# Patient Record
Sex: FEMALE | Race: WHITE | Employment: FULL TIME | URBAN - METROPOLITAN AREA
[De-identification: names, ages, dates, MRNs, and addresses within clinical notes are randomized per-mention and may not be internally consistent; named-entity substitution may affect disease eponyms.]

---

## 2017-10-10 ENCOUNTER — CARE COORDINATOR VISIT (OUTPATIENT)
Dept: CARE COORDINATION | Age: 22
End: 2017-10-10

## 2017-10-10 ENCOUNTER — OFFICE VISIT (OUTPATIENT)
Dept: FAMILY MEDICINE CLINIC | Age: 22
End: 2017-10-10

## 2017-10-10 VITALS
WEIGHT: 155 LBS | SYSTOLIC BLOOD PRESSURE: 118 MMHG | HEIGHT: 65 IN | BODY MASS INDEX: 25.83 KG/M2 | HEART RATE: 82 BPM | OXYGEN SATURATION: 98 % | DIASTOLIC BLOOD PRESSURE: 70 MMHG

## 2017-10-10 DIAGNOSIS — E11.9 TYPE 2 DIABETES MELLITUS WITHOUT COMPLICATION, WITHOUT LONG-TERM CURRENT USE OF INSULIN (HCC): ICD-10-CM

## 2017-10-10 DIAGNOSIS — Z23 NEED FOR INFLUENZA VACCINATION: Primary | ICD-10-CM

## 2017-10-10 PROCEDURE — 90630 INFLUENZA, QUADV, 18-64 YRS, ID, PF, MICRO INJ, 0.1ML (FLUZONE QUADV, PF): CPT | Performed by: FAMILY MEDICINE

## 2017-10-10 PROCEDURE — 99203 OFFICE O/P NEW LOW 30 MIN: CPT | Performed by: FAMILY MEDICINE

## 2017-10-10 PROCEDURE — 90471 IMMUNIZATION ADMIN: CPT | Performed by: FAMILY MEDICINE

## 2017-10-10 RX ORDER — LANCETS
EACH MISCELLANEOUS
COMMUNITY
Start: 2017-10-06 | End: 2017-10-10 | Stop reason: ALTCHOICE

## 2017-10-10 RX ORDER — NORETHINDRONE ACETATE AND ETHINYL ESTRADIOL AND FERROUS FUMARATE 1MG-20(24)
KIT ORAL
COMMUNITY
Start: 2017-09-21 | End: 2020-05-21

## 2017-10-10 ASSESSMENT — PATIENT HEALTH QUESTIONNAIRE - PHQ9
SUM OF ALL RESPONSES TO PHQ QUESTIONS 1-9: 0
1. LITTLE INTEREST OR PLEASURE IN DOING THINGS: 0
2. FEELING DOWN, DEPRESSED OR HOPELESS: 0
SUM OF ALL RESPONSES TO PHQ9 QUESTIONS 1 & 2: 0
SUM OF ALL RESPONSES TO PHQ QUESTIONS 1-9: 0

## 2017-10-10 NOTE — CARE COORDINATION
Independent  Ability to prepare Food Preparation:  Independent  Ability to maintain home (clean home, laundry): Independent  Ability to drive and/or has transportation:  Independent  Ability to do shopping:  Independent  Ability to manage finances: Independent  Is patient able to live independently?:  Yes     Current Housing:  Private Residence        Per the Fall Risk Screening, did the patient have 2 or more falls or 1 fall with injury in the past year?:  No     Frequent urination at night?:  Yes  Do you use rails/bars?:  No  Do you have a non-slip tub mat?:  No     Are you experiencing loss of meaning?:  No  Are you experiencing loss of hope and peace?:  No     Thinking about your patient's physical health needs, are there any symptoms or problems (risk indicators) you are unsure about that require further investigation?:  No identified areas of uncertainly or problems already being investigated   Are the patients physical health problems impacting on their mental well-being?:  No identified areas of concern   Are there any problems with your patients lifestyle behaviors (alcohol, drugs, diet, exercise) that are impacting on physical or mental well-being?:  No identified areas of concern   Do you have any other concerns about your patients mental well-being?  How would you rate their severity and impact on the patient?:  No identified areas of concern   How would you rate their home environment in terms of safety and stability (including domestic violence, insecure housing, neighbor harassment)?:  Consistently safe, supportive, stable, no identified problems   How do daily activities impact on the patient's well-being? (include current or anticipated unemployment, work, caregiving, access to transportation or other):  No identified problems or perceived positive benefits   How would you rate their social network (family, work, friends)?:  Good participation with social networks   How would you rate their

## 2017-10-10 NOTE — PATIENT INSTRUCTIONS
Learning About Insulin Pens  What is an insulin pen? An insulin pen is a device for giving insulin shots. It looks like a pen. Inside the pen is a needle and a cartridge filled with insulin. You can set the dose of insulin with a dial on the outside of the pen. You use the pen to give the insulin shot (injection). Both disposable and reusable insulin pens are available. With a disposable pen, a set amount of insulin comes in the pen ready to use. When the insulin is used up, you throw the pen away. You use a new pen the next time you need insulin. With a reusable pen, you don't throw the pen away. Instead, you reload the pen with a pre-measured cartridge of insulin. When the insulin is used up, you insert a new cartridge into the pen. Disposable and reusable pens both need a new needle with each shot. The needles come in different lengths and widths. [place] needles will prevent injecting into the muscle, especially in children or people who are lean. Thinner-width needles reduce the pricking sensation. Width is measured by gauge. The higher the number, the thinner the needle. Why do some people prefer pens? · Most people find that insulin pens are easier to use than a bottle and syringe. · Many people feel less pain (or no pain) with the smaller insulin pen needle, compared to a syringe needle. · Insulin pens may help you give yourself more accurate doses. When you draw insulin into a syringe, you must carefully measure so that you don't get too much or too little. But with a pen, you set a dial for the amount of insulin you want, and then you push the button. · Insulin pens may work better than syringes for people who don't see well or who have problems like arthritis that make it harder to use a syringe. · Using an insulin pen draws less attention from others. You can give yourself insulin with fewer people noticing. · You don't need to carry insulin bottles and syringes everywhere you go.  An insulin pen fits into a pocket or purse. What should you know about insulin pens? Each pen delivers a different brand and type (or types) of insulin. Some deliver rapid-acting insulin. Others deliver long-acting insulin. And some pens deliver a mixture of both in one shot. Pens have different colored labels, cartridge holders, or dosing knobs. Many pens have special features. For example, some pens have springs so that it takes less force to deliver a dose of insulin. Other pens have signals you can hear that let you know the insulin has been delivered. Some have memory to show the amount and time of the last dose. How do you use an insulin pen? 1. For a reusable pen, put the insulin cartridge into the pen. Disposable pens already have an insulin cartridge. Follow the directions for how to screw a new needle onto your pen. 2. Remove the outer cap from the needle. Keep this cap to use later. 3. Remove the inner cover from the needle. Be careful not to prick yourself. 4. Before each shot, prime the needle. Priming removes air from the needle. Turn the dose knob to 2 units. Hold your pen with the needle pointing up. Tap the cartridge deshpande gently to move any air bubbles to the top. Push the injection button all the way in. Watch for a stream or drop of insulin to come out of the needle. If it does not, repeat this step again. 5. Clean the area of skin where you will give the shot. If you use alcohol to clean the skin, let it dry. Use a different spot each time you inject insulin. That's because using the same spot every time can cause bumps or pits to form in the skin. For example, inject your insulin above your belly button, then the next time use your upper thigh, and then the next time inject below your belly button. 6. Turn the dose knob to the number of units of insulin you need to inject. Push the needle into your skin. Most people can inject using a 90-degree angle and without pinching the skin. Adults and children who are very lean and people who use longer needles may need to pinch the skin to avoid injecting into muscle. 7. Put your thumb on the injection button and push it in until it stops. Keep the pen in your skin. Hold the dose knob in for 10 seconds (or to the number that the  recommends). Then pull the needle out of your skin. Do not rub the area. 8. Put only the outer cap back over the needle. The thin inner cover is harder to put back on, and you could stick yourself. 9. After covering the needle with the outer cap, unscrew the needle and throw it away in a sharps container or other solid plastic container. You can get a sharps container at your drugstore. 10. Always read the insulin package information that tells the best way to store your insulin pen and insulin cartridges. In general, unopened insulin for pens will last longer if it is kept in the refrigerator. After insulin is opened, most manufacturers say to store it at room temperature. Don't share insulin pens with anyone else who uses insulin. Even when the needle is changed, an insulin pen can carry bacteria or blood that can make another person sick. Where can you learn more? Go to https://[employer]. org and sign in to your [employer] account. Enter M910 in the [employer] box to learn more about \"Learning About Insulin Pens. \"     If you do not have an account, please click on the \"Sign Up Now\" link. Current as of: March 13, 2017  Content Version: 11.3  © 9982-6300 [employer]. Care instructions adapted under license by [facility] ([facility]). If you have questions about a medical condition or this instruction, always ask your healthcare professional. [place] [address] any warranty or liability for your use of this information. Learning About Diabetes Food Guidelines  Your Care Instructions  Meal planning is important to manage diabetes.  It helps keep your blood sugar at a target level (which you set with your doctor). You don't have to eat special foods. You can eat what your family eats, including sweets once in a while. But you do have to pay attention to how often you eat and how much you eat of certain foods. You may want to work with a dietitian or a certified diabetes educator (CDE) to help you plan meals and snacks. A dietitian or CDE can also help you lose weight if that is one of your goals. What should you know about eating carbs? Managing the amount of carbohydrate (carbs) you eat is an important part of healthy meals when you have diabetes. Carbohydrate is found in many foods. · Learn which foods have carbs. And learn the amounts of carbs in different foods. ¨ Bread, cereal, pasta, and rice have about 15 grams of carbs in a serving. A serving is 1 slice of bread (1 ounce), ½ cup of cooked cereal, or 1/3 cup of cooked pasta or rice. ¨ Fruits have 15 grams of carbs in a serving. A serving is 1 small fresh fruit, such as an apple or orange; ½ of a banana; ½ cup of cooked or canned fruit; ½ cup of fruit juice; 1 cup of melon or raspberries; or 2 tablespoons of dried fruit. ¨ Milk and no-sugar-added yogurt have 15 grams of carbs in a serving. A serving is 1 cup of milk or 2/3 cup of no-sugar-added yogurt. ¨ Starchy vegetables have 15 grams of carbs in a serving. A serving is ½ cup of mashed potatoes or sweet potato; 1 cup winter squash; ½ of a small baked potato; ½ cup of cooked beans; or ½ cup cooked corn or green peas. · Learn how much carbs to eat each day and at each meal. A dietitian or CDE can teach you how to keep track of the amount of carbs you eat. This is called carbohydrate counting. · If you are not sure how to count carbohydrate grams, use the Plate Method to plan meals. It is a good, quick way to make sure that you have a balanced meal. It also helps you spread carbs throughout the day. ¨ Divide your plate by types of foods.  Put non-starchy vegetables on half the plate, meat or other protein food on one-quarter of the plate, and a grain or starchy vegetable in the final quarter of the plate. To this you can add a small piece of fruit and 1 cup of milk or yogurt, depending on how many carbs you are supposed to eat at a meal.  · Try to eat about the same amount of carbs at each meal. Do not \"save up\" your daily allowance of carbs to eat at one meal.  · Proteins have very little or no carbs per serving. Examples of proteins are beef, chicken, turkey, fish, eggs, tofu, cheese, cottage cheese, and peanut butter. A serving size of meat is 3 ounces, which is about the size of a deck of cards. Examples of meat substitute serving sizes (equal to 1 ounce of meat) are 1/4 cup of cottage cheese, 1 egg, 1 tablespoon of peanut butter, and ½ cup of tofu. How can you eat out and still eat healthy? · Learn to estimate the serving sizes of foods that have carbohydrate. If you measure food at home, it will be easier to estimate the amount in a serving of restaurant food. · If the meal you order has too much carbohydrate (such as potatoes, corn, or baked beans), ask to have a low-carbohydrate food instead. Ask for a salad or green vegetables. · If you use insulin, check your blood sugar before and after eating out to help you plan how much to eat in the future. · If you eat more carbohydrate at a meal than you had planned, take a walk or do other exercise. This will help lower your blood sugar. What else should you know? · Limit saturated fat, such as the fat from meat and dairy products. This is a healthy choice because people who have diabetes are at higher risk of heart disease. So choose lean cuts of meat and nonfat or low-fat dairy products. Use olive or canola oil instead of butter or shortening when cooking. · Don't skip meals. Your blood sugar may drop too low if you skip meals and take insulin or certain medicines for diabetes.   · Check with your doctor before you drink alcohol. Alcohol can cause your blood sugar to drop too low. Alcohol can also cause a bad reaction if you take certain diabetes medicines. Follow-up care is a key part of your treatment and safety. Be sure to make and go to all appointments, and call your doctor if you are having problems. It's also a good idea to know your test results and keep a list of the medicines you take. Where can you learn more? Go to https://Rivonopepiceweb.Quandora. org and sign in to your [employer] account. Enter E655 in the [employer] box to learn more about \"Learning About Diabetes Food Guidelines. \"     If you do not have an account, please click on the \"Sign Up Now\" link. Current as of: March 13, 2017  Content Version: 11.3  © 4769-4346 [employer]. Care instructions adapted under license by [facility] ([facility]). If you have questions about a medical condition or this instruction, always ask your healthcare professional. [address] any warranty or liability for your use of this information.

## 2017-10-10 NOTE — PROGRESS NOTES
Vaccine Information Sheet, \"Influenza - Inactivated\"  given to Regino Rush, or parent/legal guardian of  Regino Rush and verbalized understanding. Patient responses:    Have you ever had a reaction to a flu vaccine? No  Are you able to eat eggs without adverse effects? Yes  Do you have any current illness? No  Have you ever had Guillian Hamilton Syndrome? No    Flu vaccine given per order. Please see immunization tab.
educational materials given. Discussed diet and signs of hypoglycemia/ hyperglycemia. Pt to keep BG logs pre and post-prandial 3x per day. F/u in 6-7 days.     Discussed with patient medication/s dosage, instructions, potential S/E, A/R and Drug Interaction  Educational material provided regarding patient's condition  Advise to return here if worse or go to nearest ER  Encourage fluids  Pt discharged in stable condition at 14:21

## 2017-10-17 ENCOUNTER — OFFICE VISIT (OUTPATIENT)
Dept: FAMILY MEDICINE CLINIC | Age: 22
End: 2017-10-17

## 2017-10-17 VITALS
HEART RATE: 83 BPM | SYSTOLIC BLOOD PRESSURE: 106 MMHG | OXYGEN SATURATION: 99 % | WEIGHT: 157.2 LBS | DIASTOLIC BLOOD PRESSURE: 68 MMHG | HEIGHT: 65 IN | RESPIRATION RATE: 16 BRPM | BODY MASS INDEX: 26.19 KG/M2

## 2017-10-17 DIAGNOSIS — E11.9 TYPE 2 DIABETES MELLITUS WITHOUT COMPLICATION, WITHOUT LONG-TERM CURRENT USE OF INSULIN (HCC): ICD-10-CM

## 2017-10-17 DIAGNOSIS — E11.9 TYPE 2 DIABETES MELLITUS WITHOUT COMPLICATION, WITHOUT LONG-TERM CURRENT USE OF INSULIN (HCC): Primary | ICD-10-CM

## 2017-10-17 LAB
CREATININE URINE POCT: 300
HBA1C MFR BLD: 11 %
MICROALBUMIN/CREAT 24H UR: 30 MG/G{CREAT}
MICROALBUMIN/CREAT UR-RTO: <30

## 2017-10-17 PROCEDURE — 82044 UR ALBUMIN SEMIQUANTITATIVE: CPT | Performed by: FAMILY MEDICINE

## 2017-10-17 PROCEDURE — 83036 HEMOGLOBIN GLYCOSYLATED A1C: CPT | Performed by: FAMILY MEDICINE

## 2017-10-17 PROCEDURE — 99213 OFFICE O/P EST LOW 20 MIN: CPT | Performed by: FAMILY MEDICINE

## 2017-10-18 LAB
ESTIMATED AVERAGE GLUCOSE: 269 MG/DL
HBA1C MFR BLD: 11 %

## 2017-10-19 ENCOUNTER — CARE COORDINATION (OUTPATIENT)
Dept: CARE COORDINATION | Age: 22
End: 2017-10-19

## 2017-10-19 NOTE — CARE COORDINATION
Program Enrollment:  Rising Risk  Referral from Primary Care Provider:  Yes  Suggested Interventions and Community Resources  Diabetes Education:  Completed (Comment: Inital 1:1 at PCP office with [facility] RN)  Registered Dietician:  Not Started         Goals Addressed             Most Recent     Patient Stated (pt-stated)   Improving (10/19/2017)             I will work with my providers to ensure I am managing my DM better to improve my glucose    Barriers: lack of education  Plan for overcoming my barriers: I will f/u with my PCP as recommended to get my glucose under control   Confidence: 10/10  Anticipated Goal Completion Date: 11/15/2017            Prior to Admission medications    Medication Sig Start Date End Date Taking? Authorizing Provider   glucose blood VI test strips (ASCENSIA AUTODISC VI;ONE TOUCH ULTRA TEST VI) strip 1 each by In Vitro route 2 times daily As needed.  10/17/17  Yes Randy Hogan MD   insulin glargine [facility]) 100 UNIT/ML injection pen Inject 22 Units into the skin nightly for 7 days 10/17/17 10/24/17 Yes Randy Hogan MD   Norethin Ace-Eth Estrad-FE 1-20 MG-MCG(24) CHEW  9/21/17  Yes Historical Provider, MD   metFORMIN (GLUCOPHAGE) 500 MG tablet Take 2 tablets by mouth 2 times daily (with meals) 10/10/17 11/9/17 Yes Randy Hogan MD   [address] 1 each by Does not apply route 2 times daily One Touch Verio Flex 10/10/17  Yes Randy Hogan MD   insulin glargine (LANTUS) 100 UNIT/ML injection pen Inject 22 Units into the skin nightly 10/24/17   Randy Hogan MD       Future Appointments  Date Time Provider Maninder Rodas   11/29/2017 4:45 PM Randy Hogan MD [id number] BSN, RN Care Coordinator  [address] [place] [address]  [phone]

## 2017-10-20 DIAGNOSIS — E11.9 TYPE 2 DIABETES MELLITUS WITHOUT COMPLICATION, WITHOUT LONG-TERM CURRENT USE OF INSULIN (HCC): ICD-10-CM

## 2017-10-20 LAB
CHOLESTEROL, TOTAL: 175 MG/DL (ref 0–199)
HDLC SERPL-MCNC: 37 MG/DL (ref 40–60)
LDL CHOLESTEROL CALCULATED: 121 MG/DL
TRIGL SERPL-MCNC: 85 MG/DL (ref 0–150)
VLDLC SERPL CALC-MCNC: 17 MG/DL

## 2017-10-26 ENCOUNTER — CARE COORDINATION (OUTPATIENT)
Dept: CARE COORDINATION | Age: 22
End: 2017-10-26

## 2017-10-26 ENCOUNTER — TELEPHONE (OUTPATIENT)
Dept: FAMILY MEDICINE CLINIC | Age: 22
End: 2017-10-26

## 2017-10-26 NOTE — CARE COORDINATION
Ambulatory Care Coordination Note  10/26/2017  CM Risk Score: No Risk Score On File  Neo Mortality Risk Score:      ACC: Juan M Allison RN    Summary Note: Outreach call to pt to check on her status of hyperglycemia. Pt stated she is still taking the Basaglar 22 units every night at 2130. Pt stated due to insurance she was able to utilize a discount program called [employer] (on "[employer]," System site) Pt stated with her insurance her insulin was $400 a month and with [employer] it is $200 for 4 months of Basaglar (commercial pt's only). Pt stated she noticed that after she started her period her glucoses improved! Pt stated she is going to need a new refill for her test strips (one touch verio) since she is testing 6+ times a day. Per pt the [address] tried to run it through and it was denied. Will send message to PCP and t up new RX. Pt stated she is still noticing an increase in her glucose after she works out, once it spiked to 220 once. Per pt over the past week the highest reading was 220 (after working out) and she has had a few in the 120-150 range. Pt did express concerns with her fasting readings. Pt was symptomatic and her glucose was between 68-70 at time of waking up. Pt talked to about start time of medication and peak time. Pt then stated she would like to try seeing how her glucose is affect or runs if she takes the [address] in the morning. Pt aware writer will inform PCP. No further outreach due to pt not being [employer] [place] [employer]. Pt aware she can call if she has any questions or concerns related to her glucose.        Care Coordination Interventions    Program Enrollment:  Rising Risk  Referral from Primary Care Provider:  Yes  Suggested Interventions and Community Resources  Diabetes Education:  Completed (Comment: Inital 1:1 at PCP office with [facility] RN)  Medication Assistance Program:  Completed (Comment: Utilizing [employer] (on medication website) for medication discount )  Registered Dietician:

## 2017-10-26 NOTE — TELEPHONE ENCOUNTER
Error, pt will be coming to office [facility]) for test strips while she determines what is covered by her insurance. Please close encounter.

## 2017-10-31 ENCOUNTER — HOSPITAL ENCOUNTER (OUTPATIENT)
Dept: DIABETES SERVICES | Age: 22
Discharge: HOME | End: 2017-10-31
Attending: FAMILY MEDICINE | Admitting: FAMILY MEDICINE

## 2017-10-31 DIAGNOSIS — E11.9 TYPE 2 DIABETES MELLITUS WITHOUT COMPLICATIONS (HCC): ICD-10-CM

## 2017-10-31 RX ORDER — M-VIT,TX,IRON,MINS/CALC/FOLIC 27MG-0.4MG
1 TABLET ORAL DAILY
COMMUNITY

## 2017-10-31 ASSESSMENT — PATIENT HEALTH QUESTIONNAIRE - PHQ9
SUM OF ALL RESPONSES TO PHQ QUESTIONS 1-9: 0
2. FEELING DOWN, DEPRESSED OR HOPELESS: 0
SUM OF ALL RESPONSES TO PHQ9 QUESTIONS 1 & 2: 0
1. LITTLE INTEREST OR PLEASURE IN DOING THINGS: 0

## 2017-10-31 NOTE — LETTER
Diabetes Education  [facility])    TO: Elayne Mccall MD    RE: Marilin RAHMANO.B.: 1995    An initial assessment to determine diabetes education needs was completed on 10/31/2017. Education included:      carb counting    label reading     plate guide for consistent carb intake    meter start      monitoring frequency      hypoglycemia prevention/treatment    activity/exercise      PHQ2 Depression Screen; patient scored 0. Recommend further evaluation if       score >3   other: Recommend start at 30 gm carb per meal with snacks between meals, and add more carb in as her insulin needs are determined and met. An ongoing plan was created to include:    group classes                    follow up:        declined further education    Thank you for the opportunity to provide Diabetes Self Management Education to your patient.     Tashi Larios RD LD CDE  Diabetes Educator

## 2017-10-31 NOTE — PROGRESS NOTES
Individual Comprehensive DSMT Assessment:  Health Literacy:   [x] adequate   [] limited  Pre-Test Score: 4/8  Sleep Screen: not indicative of ANTONINO  PHQ9: 0      Initial instruction included:  [x] carb counting  [x] activity/exercise  [x] label reading  [x] monitoring   [] medications  [x] hypoglycemia prevention/treatment  [x] insulin management  [] other:    Education and Support Plan: Return for group classes    Referring Provider: Liseth Anderson

## 2017-11-01 ENCOUNTER — HOSPITAL ENCOUNTER (OUTPATIENT)
Dept: OTHER | Age: 22
Discharge: HOME | End: 2017-11-30
Attending: FAMILY MEDICINE | Admitting: FAMILY MEDICINE

## 2017-11-02 ENCOUNTER — TELEPHONE (OUTPATIENT)
Dept: FAMILY MEDICINE CLINIC | Age: 22
End: 2017-11-02

## 2017-11-02 NOTE — TELEPHONE ENCOUNTER
Patient thinks she is having a reaction to the Metformin prescribed by Dr. Paulo Harper. Patient started taking about 2-3- weeks. Patient is dizzy and lightheaded. Started today when she woke up. When patient moves the room spins. .  No fever, aches, chills, nausea  Blood sugars are normal.  No other new medication or OTC products.

## 2017-11-05 DIAGNOSIS — E11.9 TYPE 2 DIABETES MELLITUS WITHOUT COMPLICATION, WITHOUT LONG-TERM CURRENT USE OF INSULIN (HCC): ICD-10-CM

## 2017-11-06 NOTE — TELEPHONE ENCOUNTER
Last OV: 10/17/17  Last RF: 10/10/17 #120, 0 refills    Lab Results   Component Value Date    LABA1C 11.0 10/17/2017     Lab Results   Component Value Date    .0 10/17/2017

## 2017-11-07 ENCOUNTER — HOSPITAL ENCOUNTER (OUTPATIENT)
Dept: DIABETES SERVICES | Age: 22
Discharge: HOME | End: 2017-11-07

## 2017-11-07 DIAGNOSIS — E11.9 TYPE 2 DIABETES MELLITUS WITHOUT COMPLICATIONS (HCC): ICD-10-CM

## 2017-11-08 NOTE — PROGRESS NOTES
Patient attended RD1 Group Class.   Reviewed and patient demonstrated understanding of the following:  Appropriate timing of meals and snacks  Food sources of carbohydrate  Using Nutrition Facts Labels  Serving sizes  Carb Counting  MyPlate  Meal planning, including individualized meal pattern  Guidelines of alcohol use    Goal setting    REFERRING PROVIDER: Pramod Archer MD      Total participants in Group:11

## 2017-11-15 ENCOUNTER — TELEPHONE (OUTPATIENT)
Dept: FAMILY MEDICINE CLINIC | Age: 22
End: 2017-11-15

## 2017-11-15 DIAGNOSIS — E11.9 TYPE 2 DIABETES MELLITUS WITHOUT COMPLICATION, WITHOUT LONG-TERM CURRENT USE OF INSULIN (HCC): ICD-10-CM

## 2017-11-15 NOTE — TELEPHONE ENCOUNTER
I called and spoke with the patient. She said that when she runs out of test strips she has to buy the over-the-counter and it costs her $40 for test strips. Told her I would prescribe a larger amount of strips and send him to the pharmacy today. Also advised the patient that this is too expensive that she might be better off buying a generic Alfonso meter and using their cheaper test strips. I'll be seeing the patient in the clinic shortly.

## 2017-11-15 NOTE — TELEPHONE ENCOUNTER
Patient said she is using accutek test strips 150 strips, and she is testing 6-8 times a day. Patient would like for doctor to prescribe something with more test strips because 150 does not last her very long. Patient is currently out of strips and needs more today. Is there anyway you can fill a larger script?  Also can we please send in RX to pharmacy today as patient is completely out. (pharmacy in [facility])

## 2017-11-16 ENCOUNTER — HOSPITAL ENCOUNTER (OUTPATIENT)
Dept: DIABETES SERVICES | Age: 22
Discharge: HOME | End: 2017-11-16

## 2017-11-16 DIAGNOSIS — E11.9 TYPE 2 DIABETES MELLITUS WITHOUT COMPLICATION, UNSPECIFIED LONG TERM INSULIN USE STATUS: Primary | ICD-10-CM

## 2017-11-16 NOTE — PROGRESS NOTES
Reviewed & patient demonstrated understanding of:    Definition of diabetes  Types of diabetes  Treatment options for diabetes  Exercise--types/relationship to blood glucose/benefits/risks  Target blood glucose and A1c goals  Acute complications  Long term complications  Glucose monitoring  Diabetes medications  Cause/S&S/prevention/tx of hypoglycemia  Causes/S&S/prevention/tx of hyperglycemia    Total time with patient: 120 minutes    Total participants in group:4    Reji Daniel MD

## 2017-11-21 ENCOUNTER — HOSPITAL ENCOUNTER (OUTPATIENT)
Dept: DIABETES SERVICES | Age: 22
Discharge: HOME | End: 2017-11-21

## 2017-11-21 DIAGNOSIS — E11.9 TYPE 2 DIABETES MELLITUS WITHOUT COMPLICATION, UNSPECIFIED LONG TERM INSULIN USE STATUS: Primary | ICD-10-CM

## 2017-11-29 ENCOUNTER — OFFICE VISIT (OUTPATIENT)
Dept: FAMILY MEDICINE CLINIC | Age: 22
End: 2017-11-29

## 2017-11-29 VITALS
RESPIRATION RATE: 15 BRPM | OXYGEN SATURATION: 99 % | WEIGHT: 162 LBS | BODY MASS INDEX: 27.38 KG/M2 | HEART RATE: 91 BPM | SYSTOLIC BLOOD PRESSURE: 104 MMHG | DIASTOLIC BLOOD PRESSURE: 74 MMHG

## 2017-11-29 DIAGNOSIS — E11.9 TYPE 2 DIABETES MELLITUS WITHOUT COMPLICATION, UNSPECIFIED LONG TERM INSULIN USE STATUS: Primary | ICD-10-CM

## 2017-11-29 PROCEDURE — 99214 OFFICE O/P EST MOD 30 MIN: CPT | Performed by: FAMILY MEDICINE

## 2017-11-29 NOTE — PATIENT INSTRUCTIONS
to make sure you get the best results. Do not take this medicine in larger or smaller amounts or for longer than recommended. You may take this medicine with or without food. Follow your doctor's instructions. Your blood sugar will need to be checked often, and you may need other blood tests at your doctor's office. Low blood sugar (hypoglycemia) can happen to everyone who has diabetes. Symptoms include headache, hunger, sweating, pale skin, irritability, dizziness, feeling shaky, or trouble concentrating. Keep a source of sugar with you in case you have low blood sugar. Sugar sources include fruit juice, hard candy, crackers, raisins, and non-diet soda. Be sure your family and close friends know how to help you in an emergency. If you have severe hypoglycemia and cannot eat or drink, use a glucagon injection. Your doctor can prescribe a glucagon emergency injection kit and tell you how to use it. Also watch for signs of high blood sugar (hyperglycemia) such as increased thirst, increased urination, hunger, dry mouth, fruity breath odor, drowsiness, dry skin, blurred vision, and weight loss. Check your blood sugar carefully during times of stress, travel, illness, surgery or medical emergency, vigorous exercise, or if you drink alcohol or skip meals. These things can affect your glucose levels and your dose needs may also change. Do not change your medication dose or schedule without your doctor's advice. Sitagliptin is only part of a complete treatment program that may also include diet, exercise, weight control, regular blood sugar testing, and special medical care. Follow your doctor's instructions very closely. Store at room temperature away from moisture, heat, and light. What happens if I miss a dose? Take the missed dose as soon as you remember. Skip the missed dose if it is almost time for your next scheduled dose. Do not take extra medicine to make up the missed dose.   What happens if I professional. [address] any warranty or liability for your use of this information.

## 2017-12-01 ENCOUNTER — HOSPITAL ENCOUNTER (OUTPATIENT)
Dept: OTHER | Age: 22
Discharge: HOME | End: 2017-12-31
Attending: FAMILY MEDICINE | Admitting: FAMILY MEDICINE

## 2018-01-10 ENCOUNTER — OFFICE VISIT (OUTPATIENT)
Dept: FAMILY MEDICINE CLINIC | Age: 23
End: 2018-01-10

## 2018-01-10 VITALS
DIASTOLIC BLOOD PRESSURE: 76 MMHG | RESPIRATION RATE: 15 BRPM | HEART RATE: 79 BPM | WEIGHT: 162 LBS | SYSTOLIC BLOOD PRESSURE: 112 MMHG | OXYGEN SATURATION: 98 % | BODY MASS INDEX: 27.38 KG/M2

## 2018-01-10 DIAGNOSIS — Z00.00 ANNUAL PHYSICAL EXAM: ICD-10-CM

## 2018-01-10 DIAGNOSIS — E11.9 TYPE 2 DIABETES MELLITUS WITHOUT COMPLICATION, UNSPECIFIED LONG TERM INSULIN USE STATUS: Primary | ICD-10-CM

## 2018-01-10 LAB
GLUCOSE BLD-MCNC: NORMAL MG/DL
HBA1C MFR BLD: 6.3 %

## 2018-01-10 PROCEDURE — 82962 GLUCOSE BLOOD TEST: CPT | Performed by: FAMILY MEDICINE

## 2018-01-10 PROCEDURE — 99213 OFFICE O/P EST LOW 20 MIN: CPT | Performed by: FAMILY MEDICINE

## 2018-01-10 PROCEDURE — 83036 HEMOGLOBIN GLYCOSYLATED A1C: CPT | Performed by: FAMILY MEDICINE

## 2018-01-10 NOTE — PROGRESS NOTES
[address] Note    Date: 1/10/2018                                               Subjective/Objective:     Chief Complaint   Patient presents with    Diabetes     tests 4 times daily instead of 8 due to not able to get test strips        HPI   Pt here for f/u on DM2. Pt is now on glargine 15u qAM as well as Janumet BID. Has had 1-2 episodes of hypoglycemia, got better with juice. No vision change. Overall feels well. Last tdap 2016. Already had flu. Last pap 2017. Patient Active Problem List    Diagnosis Date Noted    Type 2 diabetes mellitus without complication ([facility] 14/86/8344       Past Medical History:   Diagnosis Date    DM type 2 (diabetes mellitus, type 2) ([facility])        Current Outpatient Prescriptions   Medication Sig Dispense Refill    insulin glargine (BASAGLAR KWIKPEN) 100 UNIT/ML injection pen Inject 15 Units into the skin daily 5 pen 2    sitaGLIPtan-metformin (JANUMET)  MG per tablet Take 1 tablet by mouth 2 times daily (with meals) 60 tablet 1    glucose blood VI test strips (ASCENSIA AUTODISC VI;ONE TOUCH ULTRA TEST VI) strip Check blood sugar 8 times daily as needed. 300 each 3    Multiple Vitamins-Minerals (THERAPEUTIC MULTIVITAMIN-MINERALS) tablet Take 1 tablet by mouth daily      Norethin Ace-Eth Estrad-FE 1-20 MG-MCG(24) CHEW       ONE TOUCH LANCETS MISC 1 each by Does not apply route 2 times daily One Touch Verio Flex 200 each 3     No current facility-administered medications for this visit. No Known Allergies    Review of Systems   No polyuria, no polydipsia. No bruise. No rash, no SOB    Vitals:  /76 (Site: Left Arm, Position: Sitting, Cuff Size: Small Adult)   Pulse 79   Resp 15   Wt 162 lb (73.5 kg)   SpO2 98%   BMI 27.38 kg/m²     Physical Exam   General:  Well-appearing, NAD, alert, non-toxic  HEENT:  Normocephalic, atraumatic. Pupils equal and round. CHEST/LUNGS: CTAB, no crackles, no wheeze, no rhonchi.  Symmetric

## 2018-01-10 NOTE — PATIENT INSTRUCTIONS
in to your [employer] account. Enter C553 in the [employer] box to learn more about \"Type 2 Diabetes: Care Instructions. \"     If you do not have an account, please click on the \"Sign Up Now\" link. Current as of: March 13, 2017  Content Version: 11.5  © 7057-3674 Healthwise, Incorporated. Care instructions adapted under license by [facility] ([facility]). If you have questions about a medical condition or this instruction, always ask your healthcare professional. [address] any warranty or liability for your use of this information.

## 2018-01-15 ENCOUNTER — OFFICE VISIT (OUTPATIENT)
Dept: ENDOCRINOLOGY | Age: 23
End: 2018-01-15

## 2018-01-15 VITALS
OXYGEN SATURATION: 100 % | HEART RATE: 86 BPM | WEIGHT: 165.4 LBS | DIASTOLIC BLOOD PRESSURE: 78 MMHG | SYSTOLIC BLOOD PRESSURE: 118 MMHG | HEIGHT: 66 IN | RESPIRATION RATE: 16 BRPM | TEMPERATURE: 98.3 F | BODY MASS INDEX: 26.58 KG/M2

## 2018-01-15 DIAGNOSIS — Z79.4 TYPE 2 DIABETES MELLITUS WITHOUT COMPLICATION, WITH LONG-TERM CURRENT USE OF INSULIN (HCC): Primary | ICD-10-CM

## 2018-01-15 DIAGNOSIS — E11.9 TYPE 2 DIABETES MELLITUS WITHOUT COMPLICATION, WITH LONG-TERM CURRENT USE OF INSULIN (HCC): Primary | ICD-10-CM

## 2018-01-15 PROCEDURE — 99243 OFF/OP CNSLTJ NEW/EST LOW 30: CPT | Performed by: INTERNAL MEDICINE

## 2018-01-15 NOTE — PROGRESS NOTES
Tobacco    Never Used      History   Alcohol Use    Yes     Comment: Few drinks a few times per month       HPI      Lois Score is a 20 y.o. female who is here for initial evaluation of uncontrolled DM. Patient is being seen at the request of Celeste Villalobos MD      Diagnosed with Diabetes Mellitus type 2 in 09/17    She was experiencing headache, fatigue, vision changes. She checked her sugars using a friends glucose monitor  and her sugars were very high. Also had polydipsia and polyuria. She went to ER. BS was 338. Beta hydroxybutyrate was high at 1.43    No weight changes. Microvascular complications: No known retinopathy (Last eye exam: 2017)   No Nephropathy. No Peripheral neuropathy    Home regimen:   Janumet  mg BID  Lantus 15 units SQ QAM      Blood glucose trend    BS     Diet: Eats 3 meals/day  Nutrition education: Yes  Exercise: twice a week. No FH of DM. Works at a [employer] institution. Review of system Please see the scanned document filled by the patient, reviewed  by me today. Physical Exam   Constitutional: She is oriented to person, place, and time. She appears well-developed and well-nourished. HENT:   Head: Normocephalic. Mouth/Throat: Oropharynx is clear and moist.   Eyes: EOM are normal. Right eye exhibits no discharge. Neck: No thyromegaly present. Cardiovascular: Normal rate and normal heart sounds. Pulmonary/Chest: Effort normal and breath sounds normal.   Abdominal: Soft. She exhibits no distension. There is no tenderness. Musculoskeletal: She exhibits no tenderness. Neurological: She is alert and oriented to person, place, and time. Skin: Skin is warm. No rash noted. She is not diaphoretic. Psychiatric: Her behavior is normal.         Lab Results   Component Value Date    LABA1C 6.3 01/10/2018        Assessment/Plan      1. Type 2 DM     Lois Score is a 20 y.o. female has new onset DM.     Most likely type 2 DM vs type 1.5 ( late onset type 1 DM of the adult)  Will check C-peptide, ELENITA antibodies, Anti-islet cell antibodies , anti-insulin antibodies. A1c 6.3 %     Sugars are controlled on current regimen.     -Continue janumet   -Continue Lantus insulin 15 units A.M      Advised follow-up with the ophthalmologist once year. Urine microalbumin/cr ratio normal in 10/17   Discussed foot care. BP at goal.     2. Hyperlipidemia. HDL low. LDL above goal.   Low risk for cardiovascular disease given her age. Will repeat again.

## 2018-01-15 NOTE — Clinical Note
Dear Dr. Elmer Franks,  Thank you so much for involving me in the care of your patient. Please review the enclosed note. Feel free to call me with any questions.  Warm Regards, Mily Samuel

## 2018-01-24 DIAGNOSIS — E11.9 TYPE 2 DIABETES MELLITUS WITHOUT COMPLICATION, UNSPECIFIED LONG TERM INSULIN USE STATUS: ICD-10-CM

## 2018-01-24 NOTE — TELEPHONE ENCOUNTER
Medication and Quantity requested: Janumet 50-1,000 MG Tablet     Last Visit  01/10/2018     Pharmacy and phone number updated in [facility]:  yes

## 2018-01-28 DIAGNOSIS — E11.9 TYPE 2 DIABETES MELLITUS WITHOUT COMPLICATION, UNSPECIFIED LONG TERM INSULIN USE STATUS: ICD-10-CM

## 2018-01-29 RX ORDER — SITAGLIPTIN AND METFORMIN HYDROCHLORIDE 1000; 50 MG/1; MG/1
TABLET, FILM COATED ORAL
Qty: 60 TABLET | Refills: 0 | Status: SHIPPED | OUTPATIENT
Start: 2018-01-29 | End: 2018-02-14

## 2018-01-31 DIAGNOSIS — Z79.4 TYPE 2 DIABETES MELLITUS WITHOUT COMPLICATION, WITH LONG-TERM CURRENT USE OF INSULIN (HCC): Primary | ICD-10-CM

## 2018-01-31 DIAGNOSIS — E11.9 TYPE 2 DIABETES MELLITUS WITHOUT COMPLICATION, WITHOUT LONG-TERM CURRENT USE OF INSULIN (HCC): ICD-10-CM

## 2018-01-31 DIAGNOSIS — E11.9 TYPE 2 DIABETES MELLITUS WITHOUT COMPLICATION, WITH LONG-TERM CURRENT USE OF INSULIN (HCC): Primary | ICD-10-CM

## 2018-02-03 ENCOUNTER — HOSPITAL ENCOUNTER (OUTPATIENT)
Dept: OTHER | Age: 23
Discharge: HOME | End: 2018-02-03
Attending: INTERNAL MEDICINE | Admitting: INTERNAL MEDICINE

## 2018-02-03 DIAGNOSIS — E11.9 TYPE 2 DIABETES MELLITUS WITHOUT COMPLICATION, WITH LONG-TERM CURRENT USE OF INSULIN (HCC): ICD-10-CM

## 2018-02-03 DIAGNOSIS — Z79.4 TYPE 2 DIABETES MELLITUS WITHOUT COMPLICATION, WITH LONG-TERM CURRENT USE OF INSULIN (HCC): ICD-10-CM

## 2018-02-03 LAB
A/G RATIO: 1.5 (ref 1.1–2.2)
ALBUMIN SERPL-MCNC: 4.5 G/DL (ref 3.4–5)
ALP BLD-CCNC: 56 U/L (ref 40–129)
ALT SERPL-CCNC: 9 U/L (ref 10–40)
ANION GAP SERPL CALCULATED.3IONS-SCNC: 14 MMOL/L (ref 3–16)
AST SERPL-CCNC: 11 U/L (ref 15–37)
BILIRUB SERPL-MCNC: 0.3 MG/DL (ref 0–1)
BUN BLDV-MCNC: 11 MG/DL (ref 7–20)
CALCIUM SERPL-MCNC: 9.6 MG/DL (ref 8.3–10.6)
CHLORIDE BLD-SCNC: 105 MMOL/L (ref 99–110)
CHOLESTEROL, TOTAL: 180 MG/DL (ref 0–199)
CO2: 24 MMOL/L (ref 21–32)
CREAT SERPL-MCNC: 0.7 MG/DL (ref 0.6–1.1)
GFR AFRICAN AMERICAN: >60
GFR NON-AFRICAN AMERICAN: >60
GLOBULIN: 3.1 G/DL
GLUCOSE BLD-MCNC: 129 MG/DL (ref 70–99)
HDLC SERPL-MCNC: 48 MG/DL (ref 40–60)
LDL CHOLESTEROL CALCULATED: 115 MG/DL
POTASSIUM SERPL-SCNC: 4.6 MMOL/L (ref 3.5–5.1)
SODIUM BLD-SCNC: 143 MMOL/L (ref 136–145)
TOTAL PROTEIN: 7.6 G/DL (ref 6.4–8.2)
TRIGL SERPL-MCNC: 86 MG/DL (ref 0–150)
TSH SERPL DL<=0.05 MIU/L-ACNC: 1.82 UIU/ML (ref 0.27–4.2)
VLDLC SERPL CALC-MCNC: 17 MG/DL

## 2018-02-05 DIAGNOSIS — Z79.4 TYPE 2 DIABETES MELLITUS WITHOUT COMPLICATION, WITH LONG-TERM CURRENT USE OF INSULIN (HCC): ICD-10-CM

## 2018-02-05 DIAGNOSIS — E11.9 TYPE 2 DIABETES MELLITUS WITHOUT COMPLICATION, WITH LONG-TERM CURRENT USE OF INSULIN (HCC): ICD-10-CM

## 2018-02-05 NOTE — TELEPHONE ENCOUNTER
Patient is calling and states that the prescriptions for Metformin and Januvia from 1/31/18 were sent to [address] [place] [address] in error. Please resubmit to [facility] asap. She is out of medication.

## 2018-02-06 LAB
GLUTAMIC ACID DECARB AB: >250 IU/ML (ref 0–5)
ISLET CELL ANTIBODY: ABNORMAL

## 2018-02-07 LAB
C-PEPTIDE: 1 NG/ML (ref 1.1–4.4)
INSULIN A: 3 U/ML (ref 0–0.4)

## 2018-02-19 ENCOUNTER — TELEPHONE (OUTPATIENT)
Dept: ENDOCRINOLOGY | Age: 23
End: 2018-02-19

## 2018-04-20 DIAGNOSIS — Z79.4 TYPE 2 DIABETES MELLITUS WITHOUT COMPLICATION, WITH LONG-TERM CURRENT USE OF INSULIN (HCC): ICD-10-CM

## 2018-04-20 DIAGNOSIS — E11.9 TYPE 2 DIABETES MELLITUS WITHOUT COMPLICATION, WITH LONG-TERM CURRENT USE OF INSULIN (HCC): ICD-10-CM

## 2018-05-22 ENCOUNTER — OFFICE VISIT (OUTPATIENT)
Dept: ENDOCRINOLOGY | Age: 23
End: 2018-05-22

## 2018-05-22 VITALS
DIASTOLIC BLOOD PRESSURE: 76 MMHG | BODY MASS INDEX: 27.06 KG/M2 | HEIGHT: 65 IN | OXYGEN SATURATION: 98 % | WEIGHT: 162.4 LBS | SYSTOLIC BLOOD PRESSURE: 117 MMHG | HEART RATE: 94 BPM | RESPIRATION RATE: 18 BRPM

## 2018-05-22 DIAGNOSIS — E11.9 TYPE 2 DIABETES MELLITUS WITHOUT COMPLICATION, WITH LONG-TERM CURRENT USE OF INSULIN (HCC): ICD-10-CM

## 2018-05-22 DIAGNOSIS — E10.9 TYPE 1 DIABETES MELLITUS WITHOUT COMPLICATION (HCC): Primary | ICD-10-CM

## 2018-05-22 DIAGNOSIS — Z79.4 TYPE 2 DIABETES MELLITUS WITHOUT COMPLICATION, WITH LONG-TERM CURRENT USE OF INSULIN (HCC): ICD-10-CM

## 2018-05-22 LAB — HBA1C MFR BLD: 8.2 %

## 2018-05-22 PROCEDURE — 99215 OFFICE O/P EST HI 40 MIN: CPT | Performed by: INTERNAL MEDICINE

## 2018-05-22 PROCEDURE — 83036 HEMOGLOBIN GLYCOSYLATED A1C: CPT | Performed by: INTERNAL MEDICINE

## 2018-05-22 ASSESSMENT — ENCOUNTER SYMPTOMS
SORE THROAT: 0
SHORTNESS OF BREATH: 0
VOMITING: 0
NAUSEA: 0
ABDOMINAL PAIN: 0
BACK PAIN: 0
BLURRED VISION: 0
COUGH: 0
HEARTBURN: 0

## 2018-05-23 ENCOUNTER — TELEPHONE (OUTPATIENT)
Dept: ENDOCRINOLOGY | Age: 23
End: 2018-05-23

## 2018-05-23 DIAGNOSIS — Z79.4 TYPE 2 DIABETES MELLITUS WITHOUT COMPLICATION, WITH LONG-TERM CURRENT USE OF INSULIN (HCC): ICD-10-CM

## 2018-05-23 DIAGNOSIS — E11.9 TYPE 2 DIABETES MELLITUS WITHOUT COMPLICATION, WITH LONG-TERM CURRENT USE OF INSULIN (HCC): ICD-10-CM

## 2018-05-23 RX ORDER — INSULIN ASPART 100 [IU]/ML
4-6 INJECTION, SOLUTION INTRAVENOUS; SUBCUTANEOUS
Qty: 15 ML | Refills: 2 | Status: SHIPPED | OUTPATIENT
Start: 2018-05-23 | End: 2019-02-06 | Stop reason: CLARIF

## 2018-05-23 RX ORDER — INSULIN ASPART 100 [IU]/ML
4-6 INJECTION, SOLUTION INTRAVENOUS; SUBCUTANEOUS
COMMUNITY
End: 2018-05-23 | Stop reason: SDUPTHER

## 2018-06-25 ENCOUNTER — TELEPHONE (OUTPATIENT)
Dept: ENDOCRINOLOGY | Age: 23
End: 2018-06-25

## 2018-07-11 ENCOUNTER — TELEPHONE (OUTPATIENT)
Dept: ENDOCRINOLOGY | Age: 23
End: 2018-07-11

## 2018-09-11 ENCOUNTER — OFFICE VISIT (OUTPATIENT)
Dept: ENDOCRINOLOGY | Age: 23
End: 2018-09-11

## 2018-09-11 VITALS
BODY MASS INDEX: 28.82 KG/M2 | WEIGHT: 173 LBS | OXYGEN SATURATION: 99 % | DIASTOLIC BLOOD PRESSURE: 83 MMHG | SYSTOLIC BLOOD PRESSURE: 117 MMHG | HEIGHT: 65 IN | HEART RATE: 82 BPM

## 2018-09-11 DIAGNOSIS — E78.2 MIXED HYPERLIPIDEMIA: ICD-10-CM

## 2018-09-11 DIAGNOSIS — E10.9 TYPE 1 DIABETES MELLITUS WITHOUT COMPLICATION (HCC): Primary | ICD-10-CM

## 2018-09-11 LAB — HBA1C MFR BLD: 7.3 %

## 2018-09-11 PROCEDURE — 99214 OFFICE O/P EST MOD 30 MIN: CPT | Performed by: INTERNAL MEDICINE

## 2018-09-11 PROCEDURE — 83036 HEMOGLOBIN GLYCOSYLATED A1C: CPT | Performed by: INTERNAL MEDICINE

## 2018-09-11 RX ORDER — BLOOD-GLUCOSE METER, WIRELESS
KIT MISCELLANEOUS
COMMUNITY

## 2018-09-11 ASSESSMENT — ENCOUNTER SYMPTOMS
SORE THROAT: 0
SHORTNESS OF BREATH: 0
COUGH: 0
ABDOMINAL PAIN: 0
HEARTBURN: 0
VOMITING: 0
BACK PAIN: 0
BLURRED VISION: 0
NAUSEA: 0

## 2018-09-11 NOTE — PATIENT INSTRUCTIONS
Lab Results   Component Value Date    LABA1C 7.3 09/11/2018     Lab Results   Component Value Date    .0 10/17/2017

## 2018-09-11 NOTE — PROGRESS NOTES
Endocrinology      Ben Champagne M.D. Phone: [phone]   FAX: [phone]       Bernadette Bernal   YOB: 1995    Date of Visit:  9/11/2018    No Known Allergies  Outpatient Prescriptions Marked as Taking for the 9/11/18 encounter (Office Visit) with Shilpi Acevedo MD   Medication Sig Dispense Refill    Blood Glucose Monitoring Suppl ([address]) w/Device KIT by Does not apply route      insulin aspart (NOVOLOG) 100 UNIT/ML injection vial Inject 100 Units into the skin every 3 days 9 vial 0    Multiple Vitamins-Minerals (THERAPEUTIC MULTIVITAMIN-MINERALS) tablet Take 1 tablet by mouth daily      Norethin Ace-Eth Estrad-FE 1-20 MG-MCG(24) CHEW            Vitals:    09/11/18 1610   BP: 117/83   Site: Left Upper Arm   Position: Sitting   Cuff Size: Medium Adult   Pulse: 82   SpO2: 99%   Weight: 173 lb (78.5 kg)   Height: 5' 5\" (1.651 m)     Body mass index is 28.79 kg/m². Wt Readings from Last 3 Encounters:   09/11/18 173 lb (78.5 kg)   05/22/18 162 lb 6.4 oz (73.7 kg)   01/15/18 165 lb 6.4 oz (75 kg)     BP Readings from Last 3 Encounters:   09/11/18 117/83   05/22/18 117/76   01/15/18 118/78        Past Medical History:   Diagnosis Date    DM type 2 (diabetes mellitus, type 2) (HCC)      Past Surgical History:   Procedure Laterality Date    TONSILLECTOMY AND ADENOIDECTOMY      TYMPANOSTOMY TUBE PLACEMENT      multiple times (3)    WISDOM TOOTH EXTRACTION       Family History   Problem Relation Age of Onset    Hypertension Maternal Grandmother     Diabetes Maternal Grandmother      History   Smoking Status    Never Smoker   Smokeless Tobacco    Never Used      History   Alcohol Use    Yes     Comment: Few drinks a few times per month       HPI      Bernadette Bernal is a 24 y.o. female who is here for a follow-up for management of uncontrolled DM.     PCP  Easton Minaya MD      Diagnosed with Diabetes Mellitus type 2 in 09/17    She was experiencing headache, fatigue,

## 2018-10-24 ENCOUNTER — NURSE ONLY (OUTPATIENT)
Dept: FAMILY MEDICINE CLINIC | Age: 23
End: 2018-10-24
Payer: COMMERCIAL

## 2018-10-24 DIAGNOSIS — Z23 NEED FOR INFLUENZA VACCINATION: Primary | ICD-10-CM

## 2018-10-24 DIAGNOSIS — E10.9 TYPE 1 DIABETES MELLITUS WITHOUT COMPLICATION (HCC): ICD-10-CM

## 2018-10-24 DIAGNOSIS — E78.2 MIXED HYPERLIPIDEMIA: ICD-10-CM

## 2018-10-24 LAB
A/G RATIO: 1.6 (ref 1.1–2.2)
ALBUMIN SERPL-MCNC: 4.1 G/DL (ref 3.4–5)
ALP BLD-CCNC: 46 U/L (ref 40–129)
ALT SERPL-CCNC: 6 U/L (ref 10–40)
ANION GAP SERPL CALCULATED.3IONS-SCNC: 12 MMOL/L (ref 3–16)
AST SERPL-CCNC: 10 U/L (ref 15–37)
BILIRUB SERPL-MCNC: 0.3 MG/DL (ref 0–1)
BUN BLDV-MCNC: 10 MG/DL (ref 7–20)
CALCIUM SERPL-MCNC: 9.3 MG/DL (ref 8.3–10.6)
CHLORIDE BLD-SCNC: 104 MMOL/L (ref 99–110)
CHOLESTEROL, TOTAL: 154 MG/DL (ref 0–199)
CO2: 25 MMOL/L (ref 21–32)
CREAT SERPL-MCNC: 0.8 MG/DL (ref 0.6–1.1)
CREATININE URINE: 381.5 MG/DL (ref 28–259)
ESTIMATED AVERAGE GLUCOSE: 168.6 MG/DL
GFR AFRICAN AMERICAN: >60
GFR NON-AFRICAN AMERICAN: >60
GLOBULIN: 2.6 G/DL
GLUCOSE BLD-MCNC: 128 MG/DL (ref 70–99)
HBA1C MFR BLD: 7.5 %
HDLC SERPL-MCNC: 40 MG/DL (ref 40–60)
LDL CHOLESTEROL CALCULATED: 100 MG/DL
MICROALBUMIN UR-MCNC: 1.6 MG/DL
MICROALBUMIN/CREAT UR-RTO: 4.2 MG/G (ref 0–30)
POTASSIUM SERPL-SCNC: 4.6 MMOL/L (ref 3.5–5.1)
SODIUM BLD-SCNC: 141 MMOL/L (ref 136–145)
TOTAL PROTEIN: 6.7 G/DL (ref 6.4–8.2)
TRIGL SERPL-MCNC: 70 MG/DL (ref 0–150)
VLDLC SERPL CALC-MCNC: 14 MG/DL

## 2018-10-24 PROCEDURE — 90471 IMMUNIZATION ADMIN: CPT | Performed by: FAMILY MEDICINE

## 2018-10-24 PROCEDURE — 90686 IIV4 VACC NO PRSV 0.5 ML IM: CPT | Performed by: FAMILY MEDICINE

## 2018-10-24 NOTE — PROGRESS NOTES
Vaccine Information Sheet, \"Influenza - Inactivated\"  given to Endy Thomas, or parent/legal guardian of  Endy Thomas and verbalized understanding. Patient responses:    Have you ever had a reaction to a flu vaccine? No  Are you able to eat eggs without adverse effects? Yes  Do you have any current illness? No  Have you ever had Guillian Lowell Syndrome? No    Flu vaccine given per order. Please see immunization tab.

## 2018-10-26 LAB — TSH, 3RD GENERATION: 1.42 MU/L (ref 0.3–4)

## 2018-12-11 ENCOUNTER — TELEPHONE (OUTPATIENT)
Dept: ENDOCRINOLOGY | Age: 23
End: 2018-12-11

## 2018-12-11 NOTE — TELEPHONE ENCOUNTER
pts sugar are dropping as low as 44 rather she eats or not,  Was working out yesterday was at 200 it dropped to 60. She hasnt changed anything with her diet.  Would like to speak to someone

## 2018-12-18 ENCOUNTER — OFFICE VISIT (OUTPATIENT)
Dept: ENDOCRINOLOGY | Age: 23
End: 2018-12-18
Payer: COMMERCIAL

## 2018-12-18 VITALS
OXYGEN SATURATION: 98 % | RESPIRATION RATE: 14 BRPM | BODY MASS INDEX: 29.49 KG/M2 | HEIGHT: 65 IN | HEART RATE: 76 BPM | WEIGHT: 177 LBS | SYSTOLIC BLOOD PRESSURE: 117 MMHG | DIASTOLIC BLOOD PRESSURE: 73 MMHG

## 2018-12-18 DIAGNOSIS — E78.2 MIXED HYPERLIPIDEMIA: ICD-10-CM

## 2018-12-18 DIAGNOSIS — E10.9 TYPE 1 DIABETES MELLITUS WITHOUT COMPLICATION (HCC): Primary | ICD-10-CM

## 2018-12-18 DIAGNOSIS — Z46.81 INSULIN PUMP TITRATION: ICD-10-CM

## 2018-12-18 PROBLEM — E11.9 TYPE 2 DIABETES MELLITUS WITHOUT COMPLICATION (HCC): Status: RESOLVED | Noted: 2017-10-10 | Resolved: 2018-12-18

## 2018-12-18 PROCEDURE — 99214 OFFICE O/P EST MOD 30 MIN: CPT | Performed by: INTERNAL MEDICINE

## 2018-12-18 ASSESSMENT — ENCOUNTER SYMPTOMS
ABDOMINAL PAIN: 0
HEARTBURN: 0
SORE THROAT: 0
VOMITING: 0
BLURRED VISION: 0
BACK PAIN: 0
NAUSEA: 0
COUGH: 0
SHORTNESS OF BREATH: 0

## 2019-02-06 ENCOUNTER — TELEPHONE (OUTPATIENT)
Dept: ENDOCRINOLOGY | Age: 24
End: 2019-02-06

## 2019-02-06 RX ORDER — INSULIN LISPRO 100 [IU]/ML
100 INJECTION, SOLUTION INTRAVENOUS; SUBCUTANEOUS DAILY
COMMUNITY
End: 2019-02-06 | Stop reason: CLARIF

## 2019-02-07 ENCOUNTER — TELEPHONE (OUTPATIENT)
Dept: ENDOCRINOLOGY | Age: 24
End: 2019-02-07

## 2019-03-28 ENCOUNTER — OFFICE VISIT (OUTPATIENT)
Dept: ENDOCRINOLOGY | Age: 24
End: 2019-03-28
Payer: COMMERCIAL

## 2019-03-28 VITALS
DIASTOLIC BLOOD PRESSURE: 82 MMHG | BODY MASS INDEX: 29.66 KG/M2 | SYSTOLIC BLOOD PRESSURE: 110 MMHG | HEART RATE: 84 BPM | WEIGHT: 178 LBS | HEIGHT: 65 IN

## 2019-03-28 DIAGNOSIS — Z46.81 INSULIN PUMP TITRATION: ICD-10-CM

## 2019-03-28 DIAGNOSIS — E10.9 TYPE 1 DIABETES MELLITUS WITHOUT COMPLICATION (HCC): Primary | ICD-10-CM

## 2019-03-28 DIAGNOSIS — E78.2 MIXED HYPERLIPIDEMIA: ICD-10-CM

## 2019-03-28 LAB — HBA1C MFR BLD: 7.3 %

## 2019-03-28 PROCEDURE — 83036 HEMOGLOBIN GLYCOSYLATED A1C: CPT | Performed by: INTERNAL MEDICINE

## 2019-03-28 PROCEDURE — 99214 OFFICE O/P EST MOD 30 MIN: CPT | Performed by: INTERNAL MEDICINE

## 2019-03-28 ASSESSMENT — ENCOUNTER SYMPTOMS
VOMITING: 0
NAUSEA: 0
ABDOMINAL PAIN: 0
SHORTNESS OF BREATH: 0
HEARTBURN: 0
BACK PAIN: 0
BLURRED VISION: 0
COUGH: 0
SORE THROAT: 0

## 2019-05-08 ENCOUNTER — TELEPHONE (OUTPATIENT)
Dept: ENDOCRINOLOGY | Age: 24
End: 2019-05-08

## 2019-05-08 NOTE — TELEPHONE ENCOUNTER
PT states she received a letter regarding a CGM Dexcom, she would like a call back to let her know if she should wait until appt in July with Dr. Carmina Pedroza or if he would like to send a script for the dexcom to her pharmacy now so she can get it.

## 2019-05-09 NOTE — TELEPHONE ENCOUNTER
Spoke to her. Given she uses [employer] Pump, will recommend she uses [employer] sensor. It was previously denied but given her recent episodes of hypoglycemia, she may be able to use it now   She would call [employer].

## 2019-07-09 ENCOUNTER — OFFICE VISIT (OUTPATIENT)
Dept: ENDOCRINOLOGY | Age: 24
End: 2019-07-09
Payer: COMMERCIAL

## 2019-07-09 VITALS
WEIGHT: 175.4 LBS | OXYGEN SATURATION: 99 % | HEART RATE: 78 BPM | SYSTOLIC BLOOD PRESSURE: 119 MMHG | HEIGHT: 66 IN | DIASTOLIC BLOOD PRESSURE: 76 MMHG | BODY MASS INDEX: 28.19 KG/M2

## 2019-07-09 DIAGNOSIS — E10.9 TYPE 1 DIABETES MELLITUS WITHOUT COMPLICATION (HCC): Primary | ICD-10-CM

## 2019-07-09 DIAGNOSIS — Z46.81 INSULIN PUMP TITRATION: ICD-10-CM

## 2019-07-09 DIAGNOSIS — E78.2 MIXED HYPERLIPIDEMIA: ICD-10-CM

## 2019-07-09 LAB — HBA1C MFR BLD: 8 %

## 2019-07-09 PROCEDURE — 83036 HEMOGLOBIN GLYCOSYLATED A1C: CPT | Performed by: INTERNAL MEDICINE

## 2019-07-09 PROCEDURE — 99214 OFFICE O/P EST MOD 30 MIN: CPT | Performed by: INTERNAL MEDICINE

## 2019-07-09 ASSESSMENT — ENCOUNTER SYMPTOMS
NAUSEA: 0
VOMITING: 0
ABDOMINAL PAIN: 0
COUGH: 0
SHORTNESS OF BREATH: 0
HEARTBURN: 0
BACK PAIN: 0
BLURRED VISION: 0
SORE THROAT: 0

## 2019-07-09 NOTE — PROGRESS NOTES
Endocrinology      Deni Copeland M.D. Phone: [phone]   FAX: [phone]       Starla Hanson   YOB: 1995    Date of Visit:  7/9/2019    No Known Allergies  Outpatient Medications Marked as Taking for the 7/9/19 encounter (Office Visit) with Clinton Sears MD   Medication Sig Dispense Refill    HUMALOG 100 UNIT/ML injection vial Inject 100 Units into the skin daily VIA INSULIN PUMP 2 vial 5    Blood Glucose Monitoring Suppl (CONTOUR NEXT LINK) w/Device KIT by Does not apply route      Insulin Pen Needle 32G X 4 MM MISC Inject insulin 4 times a day 150 each 5    Multiple Vitamins-Minerals (THERAPEUTIC MULTIVITAMIN-MINERALS) tablet Take 1 tablet by mouth daily      Norethin Ace-Eth Estrad-FE 1-20 MG-MCG(24) CHEW            Vitals:    07/09/19 1533   BP: 119/76   Site: Right Upper Arm   Position: Sitting   Cuff Size: Medium Adult   Pulse: 78   SpO2: 99%   Weight: 175 lb 6.4 oz (79.6 kg)   Height: 5' 6\" (1.676 m)     Body mass index is 28.31 kg/m².      Wt Readings from Last 3 Encounters:   07/09/19 175 lb 6.4 oz (79.6 kg)   03/28/19 178 lb (80.7 kg)   12/18/18 177 lb (80.3 kg)     BP Readings from Last 3 Encounters:   07/09/19 119/76   03/28/19 110/82   12/18/18 117/73        Past Medical History:   Diagnosis Date    DM type 2 (diabetes mellitus, type 2) (HCC)      Past Surgical History:   Procedure Laterality Date    TONSILLECTOMY AND ADENOIDECTOMY      TYMPANOSTOMY TUBE PLACEMENT      multiple times (3)    WISDOM TOOTH EXTRACTION       Family History   Problem Relation Age of Onset    Hypertension Maternal Grandmother     Diabetes Maternal Grandmother      Social History     Tobacco Use   Smoking Status Never Smoker   Smokeless Tobacco Never Used      Social History     Substance and Sexual Activity   Alcohol Use Yes    Comment: Few drinks a few times per month       HPI      Starla Hanson is a 24 y.o. female who is here for a follow-up for management of uncontrolled

## 2019-08-28 ENCOUNTER — TELEPHONE (OUTPATIENT)
Dept: ENDOCRINOLOGY | Age: 24
End: 2019-08-28

## 2019-08-28 NOTE — TELEPHONE ENCOUNTER
Patient called back, spoke to her. She reports high glucose for 2 weeks , in 200s. No fever, chills, other symptoms. No change in eating pattern. Did not report any pump issues or malfunction. Increased basal rate by 0.025 and decrease I:C by 1 unit. Helped patient make changes over the phone and walked through the settings. Advised to call back if hyperglycemia persists.

## 2019-09-16 ENCOUNTER — TELEPHONE (OUTPATIENT)
Dept: ENDOCRINOLOGY | Age: 24
End: 2019-09-16

## 2019-10-17 ENCOUNTER — OFFICE VISIT (OUTPATIENT)
Dept: ENDOCRINOLOGY | Age: 24
End: 2019-10-17
Payer: COMMERCIAL

## 2019-10-17 VITALS
HEIGHT: 66 IN | HEART RATE: 77 BPM | BODY MASS INDEX: 28.54 KG/M2 | SYSTOLIC BLOOD PRESSURE: 124 MMHG | WEIGHT: 177.6 LBS | DIASTOLIC BLOOD PRESSURE: 77 MMHG | OXYGEN SATURATION: 99 %

## 2019-10-17 DIAGNOSIS — Z46.81 INSULIN PUMP TITRATION: ICD-10-CM

## 2019-10-17 DIAGNOSIS — E10.9 TYPE 1 DIABETES MELLITUS WITHOUT COMPLICATION (HCC): Primary | ICD-10-CM

## 2019-10-17 DIAGNOSIS — E78.2 MIXED HYPERLIPIDEMIA: ICD-10-CM

## 2019-10-17 LAB — HBA1C MFR BLD: 8 %

## 2019-10-17 PROCEDURE — 99214 OFFICE O/P EST MOD 30 MIN: CPT | Performed by: INTERNAL MEDICINE

## 2019-10-17 PROCEDURE — 83036 HEMOGLOBIN GLYCOSYLATED A1C: CPT | Performed by: INTERNAL MEDICINE

## 2019-10-17 ASSESSMENT — ENCOUNTER SYMPTOMS
SHORTNESS OF BREATH: 0
HEARTBURN: 0
SORE THROAT: 0
BACK PAIN: 0
VOMITING: 0
ABDOMINAL PAIN: 0
BLURRED VISION: 0
NAUSEA: 0
COUGH: 0

## 2019-10-30 ENCOUNTER — OFFICE VISIT (OUTPATIENT)
Dept: FAMILY MEDICINE CLINIC | Age: 24
End: 2019-10-30
Payer: COMMERCIAL

## 2019-10-30 VITALS
TEMPERATURE: 98 F | DIASTOLIC BLOOD PRESSURE: 80 MMHG | BODY MASS INDEX: 28.41 KG/M2 | HEART RATE: 71 BPM | SYSTOLIC BLOOD PRESSURE: 110 MMHG | OXYGEN SATURATION: 98 % | WEIGHT: 176 LBS

## 2019-10-30 DIAGNOSIS — R05.9 COUGH: Primary | ICD-10-CM

## 2019-10-30 DIAGNOSIS — J40 BRONCHITIS: ICD-10-CM

## 2019-10-30 PROCEDURE — 99213 OFFICE O/P EST LOW 20 MIN: CPT | Performed by: FAMILY MEDICINE

## 2019-10-30 RX ORDER — PROMETHAZINE HYDROCHLORIDE AND CODEINE PHOSPHATE 6.25; 1 MG/5ML; MG/5ML
5 SYRUP ORAL EVERY 4 HOURS PRN
Qty: 118 ML | Refills: 0 | Status: SHIPPED | OUTPATIENT
Start: 2019-10-30 | End: 2019-11-06

## 2019-10-30 RX ORDER — DOXYCYCLINE HYCLATE 100 MG
100 TABLET ORAL 2 TIMES DAILY
Qty: 20 TABLET | Refills: 0 | Status: SHIPPED | OUTPATIENT
Start: 2019-10-30 | End: 2019-11-09

## 2019-12-31 DIAGNOSIS — E78.2 MIXED HYPERLIPIDEMIA: ICD-10-CM

## 2019-12-31 DIAGNOSIS — E10.9 TYPE 1 DIABETES MELLITUS WITHOUT COMPLICATION (HCC): ICD-10-CM

## 2019-12-31 LAB
A/G RATIO: 1.8 (ref 1.1–2.2)
ALBUMIN SERPL-MCNC: 4.3 G/DL (ref 3.4–5)
ALP BLD-CCNC: 55 U/L (ref 40–129)
ALT SERPL-CCNC: 8 U/L (ref 10–40)
ANION GAP SERPL CALCULATED.3IONS-SCNC: 14 MMOL/L (ref 3–16)
AST SERPL-CCNC: 10 U/L (ref 15–37)
BILIRUB SERPL-MCNC: 0.4 MG/DL (ref 0–1)
BUN BLDV-MCNC: 11 MG/DL (ref 7–20)
CALCIUM SERPL-MCNC: 9.4 MG/DL (ref 8.3–10.6)
CHLORIDE BLD-SCNC: 103 MMOL/L (ref 99–110)
CO2: 25 MMOL/L (ref 21–32)
CREAT SERPL-MCNC: 0.8 MG/DL (ref 0.6–1.1)
CREATININE URINE: 413.3 MG/DL (ref 28–259)
ESTIMATED AVERAGE GLUCOSE: 168.6 MG/DL
GFR AFRICAN AMERICAN: >60
GFR NON-AFRICAN AMERICAN: >60
GLOBULIN: 2.4 G/DL
GLUCOSE BLD-MCNC: 197 MG/DL (ref 70–99)
HBA1C MFR BLD: 7.5 %
MICROALBUMIN UR-MCNC: 2.7 MG/DL
MICROALBUMIN/CREAT UR-RTO: 6.5 MG/G (ref 0–30)
POTASSIUM SERPL-SCNC: 4.1 MMOL/L (ref 3.5–5.1)
SODIUM BLD-SCNC: 142 MMOL/L (ref 136–145)
TOTAL PROTEIN: 6.7 G/DL (ref 6.4–8.2)

## 2020-01-02 LAB — TSH, 3RD GENERATION: 2.34 MU/L (ref 0.3–4)

## 2020-01-21 ENCOUNTER — OFFICE VISIT (OUTPATIENT)
Dept: ENDOCRINOLOGY | Age: 25
End: 2020-01-21
Payer: COMMERCIAL

## 2020-01-21 VITALS
HEART RATE: 89 BPM | WEIGHT: 183 LBS | DIASTOLIC BLOOD PRESSURE: 84 MMHG | SYSTOLIC BLOOD PRESSURE: 124 MMHG | BODY MASS INDEX: 29.41 KG/M2 | OXYGEN SATURATION: 99 % | HEIGHT: 66 IN

## 2020-01-21 PROCEDURE — 99214 OFFICE O/P EST MOD 30 MIN: CPT | Performed by: INTERNAL MEDICINE

## 2020-01-21 ASSESSMENT — ENCOUNTER SYMPTOMS
COUGH: 0
ABDOMINAL PAIN: 0
HEARTBURN: 0
SORE THROAT: 0
BACK PAIN: 0
VOMITING: 0
SHORTNESS OF BREATH: 0
BLURRED VISION: 0
NAUSEA: 0

## 2020-01-21 NOTE — PROGRESS NOTES
Endocrinology      Amy Moreno M.D. Phone: [phone]   FAX: [phone]       Chuy Moreno   YOB: 1995    Date of Visit:  1/21/2020    No Known Allergies  Outpatient Medications Marked as Taking for the 1/21/20 encounter (Office Visit) with Tracy Prakash MD   Medication Sig Dispense Refill    HUMALOG 100 UNIT/ML injection vial Inject 100 Units into the skin daily VIA INSULIN PUMP 2 vial 5    Blood Glucose Monitoring Suppl (CONTOUR NEXT LINK) w/Device KIT by Does not apply route      Multiple Vitamins-Minerals (THERAPEUTIC MULTIVITAMIN-MINERALS) tablet Take 1 tablet by mouth daily      Norethin Ace-Eth Estrad-FE 1-20 MG-MCG(24) CHEW            Vitals:    01/21/20 1451   BP: 124/84   Site: Left Upper Arm   Position: Sitting   Cuff Size: Medium Adult   Pulse: 89   SpO2: 99%   Weight: 183 lb (83 kg)   Height: 5' 6\" (1.676 m)     Body mass index is 29.54 kg/m². Wt Readings from Last 3 Encounters:   01/21/20 183 lb (83 kg)   10/30/19 176 lb (79.8 kg)   10/17/19 177 lb 9.6 oz (80.6 kg)     BP Readings from Last 3 Encounters:   01/21/20 124/84   10/30/19 110/80   10/17/19 124/77        Past Medical History:   Diagnosis Date    DM type 2 (diabetes mellitus, type 2) (HCC)      Past Surgical History:   Procedure Laterality Date    TONSILLECTOMY AND ADENOIDECTOMY      TYMPANOSTOMY TUBE PLACEMENT      multiple times (3)    WISDOM TOOTH EXTRACTION       Family History   Problem Relation Age of Onset    Hypertension Maternal Grandmother     Diabetes Maternal Grandmother      Social History     Tobacco Use   Smoking Status Never Smoker   Smokeless Tobacco Never Used      Social History     Substance and Sexual Activity   Alcohol Use Yes    Comment: Few drinks a few times per month       BREANNE Cano is a 21 y.o. female who is here for a follow-up for management of uncontrolled DM.     PCP  Viri Alfaro MD      Diagnosed with Diabetes Mellitus type 2 in 09/17    She was experiencing headache, fatigue, vision changes. She checked her sugars using a friends glucose monitor  and her sugars were very high. Also had polydipsia and polyuria. She went to ER. BS was 338. Beta hydroxybutyrate was high at 1.43      Microvascular complications: No known retinopathy (Last eye exam: 2017)   No Nephropathy. No Peripheral neuropathy    Home regimen: Metformin 1000 mg BID    She is using Novolog via [employer] 630 G insulin pump since 07/18      Basal   0.00   0.725  3.00 A.M 0.750  7.00 AM   0.725  3.00 P.m 0.70    Carb ratio  0.00  12  17.00  110    Sensitivity 0.00  40    [facility] 120-140    Previous meds : janumet Lantus    Blood glucose trend  Checks 4 times a day    Fasting 150-200  Lunch 150-250  Dinner 150-250  Bedtime 100-300    Diet: Eats 3 meals/day  Nutrition education: Yes  Exercise: twice a week. No polyuria, polydipsia  No nausea, vomiting    No episodes of hypoglycemia. No FH of DM. Works at a [employer] institution. Review of Systems   Constitutional: Positive for malaise/fatigue. Negative for weight loss. HENT: Negative for sore throat. Eyes: Negative for blurred vision. Respiratory: Negative for cough and shortness of breath. Cardiovascular: Negative for chest pain and palpitations. Gastrointestinal: Negative for abdominal pain, heartburn, nausea and vomiting. Genitourinary: Negative for frequency and urgency. Musculoskeletal: Positive for joint pain. Negative for back pain and myalgias. Skin: Negative for rash. Neurological: Positive for tingling and sensory change. Negative for headaches. Endo/Heme/Allergies: Negative for polydipsia. Psychiatric/Behavioral: Negative for depression. The patient is not nervous/anxious. Physical Exam   Constitutional: She is oriented to person, place, and time. She appears well-developed and well-nourished. HENT:   Head: Normocephalic.    Mouth/Throat: Oropharynx is clear and moist.   Eyes: EOM are normal. Right eye exhibits no discharge. Neck: No thyromegaly present. Cardiovascular: Normal rate and normal heart sounds. Pulmonary/Chest: Effort normal and breath sounds normal.   Abdominal: Soft. She exhibits no distension. There is no tenderness. Musculoskeletal:         General: No tenderness. Neurological: She is alert and oriented to person, place, and time. Skin: Skin is warm. No rash noted. She is not diaphoretic. Psychiatric: Her behavior is normal.              Assessment/Plan      1. Type 1 DM     HECTOR Cano is a 21 y.o. female has Type 1 DM    Uncontrolled. ELENITA antibodies, Anti-islet cell antibodies , anti-insulin antibodies were +ve in 02/18  C-peptide is low     A1c 6.3 % --->8.2 % --->  7.3 % ---> 7.5 % --> 7.3 % ---> 8 % --> 8 % ---> 7.5 %     -Continue metformin 1000 mg BID    She is using Novolog via [email or web address] 630 G insulin pump . Reviewed pump downloads      BS high in fasting and in the evenings      Will adjust her settings as below    Basal   0.00   0.725  3.00 A.M 0.750  7.00 AM   0.725  3.00 P.m 0.70    Carb ratio  0.00  12  17.00  110    Change sensitivity to 1:40        Advised follow-up with the ophthalmologist once year. Urine microalbumin/cr ratio normal in 10/17, 10/18 ,  12/19   Discussed foot care. TSH normal in 12/19 ( 2.34)     BP at goal.     2. Hyperlipidemia. HDL 48---> 40   --->100  TGD 86---> 70  Low risk for cardiovascular disease given her age.    Will monitor

## 2020-01-27 ENCOUNTER — TELEPHONE (OUTPATIENT)
Dept: ENDOCRINOLOGY | Age: 25
End: 2020-01-27

## 2020-02-06 ENCOUNTER — OFFICE VISIT (OUTPATIENT)
Dept: FAMILY MEDICINE CLINIC | Age: 25
End: 2020-02-06
Payer: COMMERCIAL

## 2020-02-06 VITALS
OXYGEN SATURATION: 98 % | WEIGHT: 178 LBS | SYSTOLIC BLOOD PRESSURE: 120 MMHG | HEART RATE: 92 BPM | DIASTOLIC BLOOD PRESSURE: 78 MMHG | BODY MASS INDEX: 28.73 KG/M2

## 2020-02-06 PROCEDURE — 99213 OFFICE O/P EST LOW 20 MIN: CPT | Performed by: FAMILY MEDICINE

## 2020-02-06 RX ORDER — ERYTHROMYCIN 5 MG/G
OINTMENT OPHTHALMIC
Qty: 1 G | Refills: 0 | Status: SHIPPED | OUTPATIENT
Start: 2020-02-06 | End: 2020-02-16

## 2020-02-06 ASSESSMENT — PATIENT HEALTH QUESTIONNAIRE - PHQ9
SUM OF ALL RESPONSES TO PHQ9 QUESTIONS 1 & 2: 0
SUM OF ALL RESPONSES TO PHQ QUESTIONS 1-9: 0
2. FEELING DOWN, DEPRESSED OR HOPELESS: 0
SUM OF ALL RESPONSES TO PHQ QUESTIONS 1-9: 0
1. LITTLE INTEREST OR PLEASURE IN DOING THINGS: 0

## 2020-02-06 NOTE — PROGRESS NOTES
[address] Note    Date: 2/6/2020                                               Subjective/Objective:     Chief Complaint   Patient presents with    Otalgia     RT. [facility] [place] [facility]     redness. HPI   R ear pain starting 3 days ago. Getting worse. Also reports R eye crusting and redness starting yesterday. No pain. Doesn't hurt to move. Patient Active Problem List    Diagnosis Date Noted    Type 1 diabetes mellitus without complication ([facility] [address] 72/11/8912    Mixed hyperlipidemia 09/11/2018       Past Medical History:   Diagnosis Date    DM type 2 (diabetes mellitus, type 2) ([facility])        Current Outpatient Medications   Medication Sig Dispense Refill    erythromycin (ROMYCIN) 5 MG/GM ophthalmic ointment Apply a thin ribbon to right eyelashes 3 times daily 1 g 0    neomycin-polymyxin-hydrocortisone (CORTISPORIN) 3.5-73512-0 otic solution Apply 4 drops to right ear 3 times daily for 7 days 1 Bottle 0    insulin aspart (NOVOLOG) 100 UNIT/ML injection vial Inject 100 Units into the skin daily Via Insulin pump. 3 vial 3    HUMALOG 100 UNIT/ML injection vial Inject 100 Units into the skin daily VIA INSULIN PUMP 2 vial 5    Blood Glucose Monitoring Suppl (CONTOUR NEXT LINK) w/Device KIT by Does not apply route      Multiple Vitamins-Minerals (THERAPEUTIC MULTIVITAMIN-MINERALS) tablet Take 1 tablet by mouth daily      Norethin Ace-Eth Estrad-FE 1-20 MG-MCG(24) CHEW        No current facility-administered medications for this visit. No Known Allergies    Review of Systems   No fever, no runny nose, no cough    Vitals:  /78   Pulse 92   Wt 178 lb (80.7 kg)   LMP 12/27/2019 (Approximate)   SpO2 98%   BMI 28.73 kg/m²     Physical Exam   General:  Well-appearing, NAD, alert, non-toxic  HEENT:  Normocephalic, atraumatic.  Pupils equal and round.  + Mild injection of right eye sclera.  + Yellow dry crusting around right lower eyelashes.  + Clear thin drainage from right

## 2020-02-06 NOTE — PATIENT INSTRUCTIONS
closely for changes in your health, and be sure to contact your doctor if:    · You have new or worse symptoms.     · You are not getting better after taking an antibiotic for 2 days. Where can you learn more? Go to [email or web address]. org and sign in to your [email or web address] account. Enter S613 in the [employer] box to learn more about \"Ear Infection (Otitis Media): Care Instructions. \"     If you do not have an account, please click on the \"Sign Up Now\" link. Current as of: July 28, 2019  Content Version: 12.3  © 0210-9422 Healthwise, Incorporated. Care instructions adapted under license by [facility] ([facility]). If you have questions about a medical condition or this instruction, always ask your healthcare professional. [address] any warranty or liability for your use of this information.

## 2020-02-10 ENCOUNTER — TELEPHONE (OUTPATIENT)
Dept: ENDOCRINOLOGY | Age: 25
End: 2020-02-10

## 2020-02-10 NOTE — TELEPHONE ENCOUNTER
Spoke to her  Sugars running high. No clear etiology    These changes were made over the phone.    Basal   0.00   0.725---> 0.75  3.00 A.M 0.750---> 0.80  7.00 AM   0.725---> 0.750  3.00 P.m 0.70---> 0.75        Change sensitivity to 1:40---> 35    Advised to call back if sugars are still high.

## 2020-02-18 ENCOUNTER — TELEPHONE (OUTPATIENT)
Dept: ENDOCRINOLOGY | Age: 25
End: 2020-02-18

## 2020-02-18 NOTE — TELEPHONE ENCOUNTER
Patient needs assistance with setting up a new pump due to recall. issues. she needs help with the settings. She will be receiving it on 2/19/20.

## 2020-03-17 ENCOUNTER — TELEPHONE (OUTPATIENT)
Dept: ENDOCRINOLOGY | Age: 25
End: 2020-03-17

## 2020-03-18 NOTE — TELEPHONE ENCOUNTER
Spoke to [facility] and advised we have not received anything from them but have from [address] [place] [address]. They will look into this matter.   3/18/2020 @ 1247pm.

## 2020-03-20 ENCOUNTER — TELEPHONE (OUTPATIENT)
Dept: ENDOCRINOLOGY | Age: 25
End: 2020-03-20

## 2020-03-20 NOTE — TELEPHONE ENCOUNTER
Spoke with pt and advised to call [facility] and have them fax us paperwork over for her supplies. Pt stated understanding.  FM 3/20/2020 @ 1030am.

## 2020-03-20 NOTE — TELEPHONE ENCOUNTER
Patient is calling to see if paperwork can be sent to [facility] services regarding her diabetic testing, sensor and pump supplies. This company is under her insurance.     Please call [phone]

## 2020-05-21 ENCOUNTER — VIRTUAL VISIT (OUTPATIENT)
Dept: ENDOCRINOLOGY | Age: 25
End: 2020-05-21
Payer: COMMERCIAL

## 2020-05-21 PROCEDURE — 99214 OFFICE O/P EST MOD 30 MIN: CPT | Performed by: INTERNAL MEDICINE

## 2020-05-21 ASSESSMENT — ENCOUNTER SYMPTOMS
BACK PAIN: 0
ABDOMINAL PAIN: 0
COUGH: 0
SHORTNESS OF BREATH: 0
HEARTBURN: 0
VOMITING: 0
BLURRED VISION: 0
NAUSEA: 0
SORE THROAT: 0

## 2020-05-21 NOTE — PROGRESS NOTES
Endocrinology      Rafia Serrato M.D. Phone: [phone]   FAX: [phone]       Sonia Flores   YOB: 1995    Date of Visit:  5/21/2020    No Known Allergies  Outpatient Medications Marked as Taking for the 5/21/20 encounter (Virtual Visit) with Adriana Chang MD   Medication Sig Dispense Refill    neomycin-polymyxin-hydrocortisone (CORTISPORIN) 3.5-93145-6 otic solution Apply 4 drops to right ear 3 times daily for 7 days 1 Bottle 0    insulin aspart (NOVOLOG) 100 UNIT/ML injection vial Inject 100 Units into the skin daily Via Insulin pump. 3 vial 3    HUMALOG 100 UNIT/ML injection vial Inject 100 Units into the skin daily VIA INSULIN PUMP 2 vial 5    Blood Glucose Monitoring Suppl (CONTOUR NEXT LINK) w/Device KIT by Does not apply route      Multiple Vitamins-Minerals (THERAPEUTIC MULTIVITAMIN-MINERALS) tablet Take 1 tablet by mouth daily           There were no vitals filed for this visit. There is no height or weight on file to calculate BMI.      Wt Readings from Last 3 Encounters:   02/06/20 178 lb (80.7 kg)   01/21/20 183 lb (83 kg)   10/30/19 176 lb (79.8 kg)     BP Readings from Last 3 Encounters:   02/06/20 120/78   01/21/20 124/84   10/30/19 110/80        Past Medical History:   Diagnosis Date    [facility]      Past Surgical History:   Procedure Laterality Date    TONSILLECTOMY AND ADENOIDECTOMY      TYMPANOSTOMY TUBE PLACEMENT      multiple times (3)    WISDOM TOOTH EXTRACTION       Family History   Problem Relation Age of Onset    Hypertension Maternal Grandmother     Diabetes Maternal Grandmother      Social History     Tobacco Use   Smoking Status Never Smoker   Smokeless Tobacco Never Used      Social History     Substance and Sexual Activity   Alcohol Use Yes    Comment: Few drinks a few times per month       BREANNE Cano is a 21 y.o. female who was seen in a virtual visit for follow-up for management of

## 2020-05-28 DIAGNOSIS — E10.9 TYPE 1 DIABETES MELLITUS WITHOUT COMPLICATION (HCC): ICD-10-CM

## 2020-05-28 DIAGNOSIS — E78.2 MIXED HYPERLIPIDEMIA: ICD-10-CM

## 2020-05-28 LAB
A/G RATIO: 2 (ref 1.1–2.2)
ALBUMIN SERPL-MCNC: 4.5 G/DL (ref 3.4–5)
ALP BLD-CCNC: 87 U/L (ref 40–129)
ALT SERPL-CCNC: 14 U/L (ref 10–40)
ANION GAP SERPL CALCULATED.3IONS-SCNC: 12 MMOL/L (ref 3–16)
AST SERPL-CCNC: 17 U/L (ref 15–37)
BILIRUB SERPL-MCNC: 0.3 MG/DL (ref 0–1)
BUN BLDV-MCNC: 14 MG/DL (ref 7–20)
CALCIUM SERPL-MCNC: 9.2 MG/DL (ref 8.3–10.6)
CHLORIDE BLD-SCNC: 102 MMOL/L (ref 99–110)
CHOLESTEROL, TOTAL: 180 MG/DL (ref 0–199)
CO2: 25 MMOL/L (ref 21–32)
CREAT SERPL-MCNC: 0.8 MG/DL (ref 0.6–1.1)
CREATININE URINE: 288.9 MG/DL (ref 28–259)
GFR AFRICAN AMERICAN: >60
GFR NON-AFRICAN AMERICAN: >60
GLOBULIN: 2.2 G/DL
GLUCOSE BLD-MCNC: 198 MG/DL (ref 70–99)
HDLC SERPL-MCNC: 64 MG/DL (ref 40–60)
LDL CHOLESTEROL CALCULATED: 102 MG/DL
MICROALBUMIN UR-MCNC: <1.2 MG/DL
MICROALBUMIN/CREAT UR-RTO: ABNORMAL MG/G (ref 0–30)
POTASSIUM SERPL-SCNC: 5.1 MMOL/L (ref 3.5–5.1)
SODIUM BLD-SCNC: 139 MMOL/L (ref 136–145)
TOTAL PROTEIN: 6.7 G/DL (ref 6.4–8.2)
TRIGL SERPL-MCNC: 71 MG/DL (ref 0–150)
VLDLC SERPL CALC-MCNC: 14 MG/DL

## 2020-05-29 LAB
ESTIMATED AVERAGE GLUCOSE: 139.9 MG/DL
HBA1C MFR BLD: 6.5 %

## 2020-09-28 ENCOUNTER — OFFICE VISIT (OUTPATIENT)
Dept: ENDOCRINOLOGY | Age: 25
End: 2020-09-28
Payer: COMMERCIAL

## 2020-09-28 VITALS
BODY MASS INDEX: 29.18 KG/M2 | HEART RATE: 83 BPM | OXYGEN SATURATION: 98 % | SYSTOLIC BLOOD PRESSURE: 122 MMHG | WEIGHT: 181.6 LBS | HEIGHT: 66 IN | DIASTOLIC BLOOD PRESSURE: 78 MMHG

## 2020-09-28 LAB — HBA1C MFR BLD: 6.2 %

## 2020-09-28 PROCEDURE — 83036 HEMOGLOBIN GLYCOSYLATED A1C: CPT | Performed by: INTERNAL MEDICINE

## 2020-09-28 PROCEDURE — 99214 OFFICE O/P EST MOD 30 MIN: CPT | Performed by: INTERNAL MEDICINE

## 2020-09-28 RX ORDER — MEDROXYPROGESTERONE ACETATE 10 MG/1
TABLET ORAL
COMMUNITY
Start: 2020-09-23

## 2020-09-28 ASSESSMENT — ENCOUNTER SYMPTOMS
COUGH: 0
SORE THROAT: 0
HEARTBURN: 0
VOMITING: 0
BACK PAIN: 0
BLURRED VISION: 0
ABDOMINAL PAIN: 0
NAUSEA: 0
SHORTNESS OF BREATH: 0

## 2020-09-28 NOTE — PROGRESS NOTES
Endocrinology      Kj Rebolledo M.D. Phone: [phone]   FAX: [phone]       Lisa Galindo   YOB: 1995    Date of Visit:  9/28/2020    No Known Allergies  Outpatient Medications Marked as Taking for the 9/28/20 encounter (Office Visit) with Whit Tello MD   Medication Sig Dispense Refill    neomycin-polymyxin-hydrocortisone (CORTISPORIN) 3.5-86203-3 otic solution Apply 4 drops to right ear 3 times daily for 7 days 1 Bottle 0    HUMALOG 100 UNIT/ML injection vial Inject 100 Units into the skin daily VIA INSULIN PUMP 2 vial 5    Blood Glucose Monitoring Suppl (CONTOUR NEXT LINK) w/Device KIT by Does not apply route      Multiple Vitamins-Minerals (THERAPEUTIC MULTIVITAMIN-MINERALS) tablet Take 1 tablet by mouth daily           Vitals:    09/28/20 0941   BP: 122/78   Site: Left Upper Arm   Position: Sitting   Cuff Size: Medium Adult   Pulse: 83   SpO2: 98%   Weight: 181 lb 9.6 oz (82.4 kg)   Height: 5' 6\" (1.676 m)     Body mass index is 29.31 kg/m².      Wt Readings from Last 3 Encounters:   09/28/20 181 lb 9.6 oz (82.4 kg)   02/06/20 178 lb (80.7 kg)   01/21/20 183 lb (83 kg)     BP Readings from Last 3 Encounters:   09/28/20 122/78   02/06/20 120/78   01/21/20 124/84        Past Medical History:   Diagnosis Date    DM type 2 (diabetes mellitus, type 2) ([address]      Past Surgical History:   Procedure Laterality Date    TONSILLECTOMY AND ADENOIDECTOMY      TYMPANOSTOMY TUBE PLACEMENT      multiple times (3)    WISDOM TOOTH EXTRACTION       Family History   Problem Relation Age of Onset    Hypertension Maternal Grandmother     Diabetes Maternal Grandmother      Social History     Tobacco Use   Smoking Status Never Smoker   Smokeless Tobacco Never Used      Social History     Substance and Sexual Activity   Alcohol Use Yes    Comment: Few drinks a few times per month       BREANNE      Navneet Cano is a 31 y.o. female who was seen for follow-up for management of uncontrolled DM. PCP  Dionne Hemphill MD       Diagnosed with Diabetes Mellitus type 2 in 09/17    She was experiencing headache, fatigue, vision changes. She checked her sugars using a friends glucose monitor  and her sugars were very high. Also had polydipsia and polyuria. She went to ER. BS was 338. Beta hydroxybutyrate was high at 1.43      Microvascular complications: No known retinopathy (Last eye exam: 2017)   No Nephropathy. No Peripheral neuropathy    Home regimen:     She is using Novolog via [employer] 630 G insulin pump since 07/18    Basal   0.00   0.750  3.00 A.M 0.800  7.00 AM   0.750  3.00 P.m 0.750    Carb ratio  0.00  12  17.00  10    Sensitivity  1:35    Target 120-140    Previous meds : janumet, Lantus,Metformin     Blood glucose trend  Checks 4 times a day    BS     Hypoglycemia at noon when more active. Diet: Eats 3 meals/day  Nutrition education: Yes  Exercise: twice a week. No polyuria, polydipsia  No nausea, vomiting    No episodes of hypoglycemia. No FH of DM. Works at a [employer]. Review of Systems   Constitutional: Negative for malaise/fatigue and weight loss. HENT: Negative for sore throat. Eyes: Negative for blurred vision. Respiratory: Negative for cough and shortness of breath. Cardiovascular: Negative for chest pain and palpitations. Gastrointestinal: Negative for abdominal pain, heartburn, nausea and vomiting. Genitourinary: Negative for frequency and urgency. Musculoskeletal: Negative for back pain, joint pain and myalgias. Skin: Negative for rash. Neurological: Negative for tingling, sensory change and headaches. Endo/Heme/Allergies: Negative for polydipsia. Psychiatric/Behavioral: Negative for depression. The patient is not nervous/anxious. Assessment/Plan      1. Type 1 DM     HECTOR Cano is a 31 y.o. female has Type 1 DM    Uncontrolled.      ELENITA antibodies, Anti-islet cell antibodies , anti-insulin antibodies were +ve in 02/18  C-peptide is low     A1c 6.3 % --->8.2 % --->  7.3 % ---> 7.5 % --> 7.3 % ---> 8 % --> 8 % ---> 7.5 % --> 6.2 %       She is using Novolog via [employer] 630 G insulin pump . She is having some low sugars have more active. Advised to eat something before activity and use 50 % on temp basal.     BS low normal in fasting     Will adjust settings    0.00   0.750---> 0.725  3.00 A.M 0.800---> 0.775  7.00 AM   0.750  3.00 P.m 0.750      She is planning to get pregnant  Will recommend management by high risk obstetrics during pregnancy. Advised follow-up with the ophthalmologist once year. Urine microalbumin/cr ratio normal in 10/17, 10/18 ,  12/19   Discussed foot care. TSH normal in 12/19 ( 2.34)     BP at goal.     2. Hyperlipidemia. HDL 48---> 40   --->100  TGD 86---> 70  Low risk for cardiovascular disease given her age.    Will monitor

## 2020-10-30 ENCOUNTER — OFFICE VISIT (OUTPATIENT)
Dept: FAMILY MEDICINE CLINIC | Age: 25
End: 2020-10-30
Payer: COMMERCIAL

## 2020-10-30 VITALS
OXYGEN SATURATION: 100 % | HEART RATE: 82 BPM | SYSTOLIC BLOOD PRESSURE: 122 MMHG | TEMPERATURE: 97.7 F | HEIGHT: 66 IN | DIASTOLIC BLOOD PRESSURE: 64 MMHG | WEIGHT: 184 LBS | BODY MASS INDEX: 29.57 KG/M2

## 2020-10-30 PROCEDURE — 99213 OFFICE O/P EST LOW 20 MIN: CPT | Performed by: NURSE PRACTITIONER

## 2020-10-30 RX ORDER — CYCLOBENZAPRINE HCL 10 MG
10 TABLET ORAL 3 TIMES DAILY PRN
Qty: 15 TABLET | Refills: 0 | Status: SHIPPED | OUTPATIENT
Start: 2020-10-30 | End: 2020-11-04

## 2020-10-30 ASSESSMENT — ENCOUNTER SYMPTOMS
SHORTNESS OF BREATH: 0
WHEEZING: 0
TROUBLE SWALLOWING: 0
COUGH: 0
GASTROINTESTINAL NEGATIVE: 1
CHEST TIGHTNESS: 0

## 2020-10-30 NOTE — PROGRESS NOTES
10/30/2020     HECTOR Cano (:  1995) is a 31 y.o. female, here for evaluation of the following medical concerns:    Chief Complaint   Patient presents with    Neck Pain    Other     plantar warts on both feet        Warts  Patient complains of warts. The warts are located on the feet bilateral. They have been present for 6 months. They deny pain or cellulitic infection symptoms. Three noted on right foot, one on left foot    Neck Pain    This is a new (no pain, c/o twitching) problem. The current episode started in the past 7 days. The problem occurs constantly. The problem has been gradually improving. The pain is associated with nothing. The pain is present in the right side. Quality: no pain. The pain is at a severity of 0/10. The patient is experiencing no pain. Nothing aggravates the symptoms. Pertinent negatives include no chest pain, fever, headaches, numbness, paresis, tingling, trouble swallowing or weakness. She has tried nothing for the symptoms. Review of Systems   Constitutional: Negative for chills, fatigue and fever. HENT: Negative. Negative for trouble swallowing. Respiratory: Negative for cough, chest tightness, shortness of breath and wheezing. Cardiovascular: Negative for chest pain and palpitations. Gastrointestinal: Negative. Genitourinary: Negative. Musculoskeletal: Positive for neck pain. Neck twitching   Skin: Negative. Neurological: Negative for dizziness, tingling, tremors, syncope, weakness, light-headedness, numbness and headaches. Psychiatric/Behavioral: Negative. Prior to Visit Medications    Medication Sig Taking?  Authorizing Provider   cyclobenzaprine (FLEXERIL) 10 MG tablet Take 1 tablet by mouth 3 times daily as needed for Muscle spasms Yes Kat Woods, APRN - CNP   medroxyPROGESTERone (PROVERA) 10 MG tablet  Yes Historical Provider, MD   neomycin-polymyxin-hydrocortisone (CORTISPORIN) 3.5-79401-0 otic solution Apply 4 drops to right ear 3 times daily for 7 days Yes Parminder Stahl MD   HUMALOG 100 UNIT/ML injection vial Inject 100 Units into the skin daily Damian Arik Yes Mey Webster MD   Blood Glucose Monitoring Suppl ([address]) w/Device KIT by Does not apply route Yes Historical Provider, MD   Multiple Vitamins-Minerals (THERAPEUTIC MULTIVITAMIN-MINERALS) tablet Take 1 tablet by mouth daily Yes Historical Provider, MD        Social History     Tobacco Use    Smoking status: Never Smoker    Smokeless tobacco: Never Used   Substance Use Topics    Alcohol use: Yes     Comment: Few drinks a few times per month    Drug use: No        Vitals:    10/30/20 0922   BP: 122/64   Pulse: 82   Temp: 97.7 °F (36.5 °C)   SpO2: 100%   Weight: 184 lb (83.5 kg)   Height: 5' 6\" (1.676 m)     Estimated body mass index is 29.7 kg/m² as calculated from the following:    Height as of this encounter: 5' 6\" (1.676 m). Weight as of this encounter: 184 lb (83.5 kg). Physical Exam  Vitals signs and nursing note reviewed. Constitutional:       General: She is awake. She is not in acute distress. Appearance: Normal appearance. She is well-developed, well-groomed and normal weight. She is not ill-appearing, toxic-appearing or diaphoretic. HENT:      Head: Normocephalic. Neck:      Musculoskeletal: Full passive range of motion without pain, normal range of motion and neck supple. Normal range of motion. No edema, erythema, neck rigidity, crepitus, injury, pain with movement, torticollis, spinous process tenderness or muscular tenderness. Thyroid: No thyroid mass. Vascular: No carotid bruit. Cardiovascular:      Rate and Rhythm: Normal rate and regular rhythm. Heart sounds: Normal heart sounds, S1 normal and S2 normal. No murmur. Pulmonary:      Effort: Pulmonary effort is normal.      Breath sounds: Normal breath sounds. Musculoskeletal:      Cervical back: She exhibits spasm.  She exhibits normal range of motion, no tenderness, no bony tenderness, no swelling, no edema, no deformity and no pain. Back:       Right lower leg: No edema. Left lower leg: No edema. Feet:    Lymphadenopathy:      Cervical: No cervical adenopathy. Skin:     General: Skin is warm and dry. Capillary Refill: Capillary refill takes less than 2 seconds. Neurological:      General: No focal deficit present. Mental Status: She is alert and oriented to person, place, and time. Mental status is at baseline. Psychiatric:         Mood and Affect: Mood normal.         Behavior: Behavior normal. Behavior is cooperative. Thought Content: Thought content normal.         Judgment: Judgment normal.     ASSESSMENT/PLAN:  1. Neck complaint  Start - cyclobenzaprine (FLEXERIL) 10 MG tablet; Take 1 tablet by mouth 3 times daily as needed for Muscle spasms  Dispense: 15 tablet; Refill: 0  - External Referral To Chiropractic per patient request  [address]  [phone]    2. Plantar warts  Patient is Type 1 Diabetic  - Amb External Referral To Podiatry  Leonardo Robertson D.P.M.  [address] [place] [address], [place]   [phone]    Return in about 1 week (around 11/6/2020).

## 2020-10-30 NOTE — PATIENT INSTRUCTIONS
Patient Education        Plantar Warts: Care Instructions  Your Care Instructions  A plantar wart is a harmless skin growth. Plantar warts occur on the bottom of your feet and may be painful when you walk. A virus makes the top layer of skin grow quickly, causing a wart. Warts usually go away on their own in months or years. Warts are spread easily. You can infect yourself again by touching the wart and then touching another part of your body. You also can infect others by sharing towels, razors, or other personal items. Most plantar warts do not need treatment. But if warts cause you pain or spread, your doctor may recommend that you use an over-the-counter treatment. These include salicylic acid or duct tape. Your doctor may prescribe a stronger medicine to put on warts or may inject them with medicine. Your doctor also can remove warts through surgery or by freezing them. Follow-up care is a key part of your treatment and safety. Be sure to make and go to all appointments, and call your doctor if you are having problems. It's also a good idea to know your test results and keep a list of the medicines you take. How can you care for yourself at home? · Use salicylic acid or duct tape as your doctor directs. You put the medicine or the tape on a wart for a while and then file down the dead skin on the wart. You use the salicylic acid treatment for 2 to 3 months or the tape for 1 to 2 months. · If your doctor prescribes medicine to put on warts, use it exactly as prescribed. Call your doctor if you think you are having a problem with your medicine. · Wear comfortable shoes and socks. Avoid high heels or shoes that put a lot of pressure on your foot. · Pad the wart with doughnut-shaped felt or a moleskin patch. You can buy these at a [employer]. Put the pad around the plantar wart so that it relieves pressure on the wart.  You also can place pads or cushions in your shoes to make walking more comfortable. · Take an over-the-counter medicine, such as acetaminophen (Tylenol), ibuprofen (Advil, Motrin), or naproxen (Aleve) if you have pain. Read and follow all instructions on the label. · Do not take two or more pain medicines at the same time unless the doctor told you to. Many pain medicines have acetaminophen, which is Tylenol. Too much acetaminophen (Tylenol) can be harmful. When should you call for help? Call your doctor now or seek immediate medical care if:    · You have signs of infection, such as:  ? Increased pain, swelling, warmth, or redness. ? Red streaks leading from a wart. ? Pus draining from a wart. ? A fever. Watch closely for changes in your health, and be sure to contact your doctor if:    · You do not get better as expected. Where can you learn more? Go to https://Knopepiceweb.Visicon Technologies. org and sign in to your Mobilitus account. Enter S429 in the [employer] box to learn more about \"Plantar Warts: Care Instructions. \"     If you do not have an account, please click on the \"Sign Up Now\" link. Current as of: July 2, 2020               Content Version: 12.6  © 2006-2020 [employer]. Care instructions adapted under license by [facility] ([facility]). If you have questions about a medical condition or this instruction, always ask your healthcare professional. [place] [address] any warranty or liability for your use of this information. Patient Education        Warts: Care Instructions  Your Care Instructions  A wart is a harmless skin growth caused by a virus. The virus makes the top layer of skin grow quickly, causing a wart. Warts usually go away on their own in months or years. There are several types of warts. Common warts appear most often on the hands, but they may be anywhere on the body. Plantar warts occur on the soles of the feet and may cause pain when you walk. Warts spread easily.  You can reinfect yourself by touching the wart and then touching another part of your body. You can infect others by sharing towels, razors, or other personal items. Most warts do not need treatment and go away on their own. But if warts cause pain or spread, your doctor may recommend that you use an over-the-counter treatment. These include salicylic acid or duct tape. Or your doctor may prescribe a stronger medicine to put on warts or may inject them with medicine. The doctor also can remove warts through surgery or by freezing them. Follow-up care is a key part of your treatment and safety. Be sure to make and go to all appointments, and call your doctor if you are having problems. It's also a good idea to know your test results and keep a list of the medicines you take. How can you care for yourself at home? For common warts  · Use salicylic acid or duct tape as your doctor directs. You put the medicine or the tape on a wart for several days and then file down the dead skin on the wart. You use the salicylic acid treatment for 2 to 3 months or the tape for 1 to 2 months. · If your doctor prescribes medicine to put on warts, use it exactly as directed. Call your doctor if you think you are having a problem with your medicine. For plantar (foot) warts  · Wear comfortable shoes and socks. Avoid high heels and shoes that put a lot of pressure on your foot. · Pad the wart with doughnut-shaped felt or a moleskin patch. You can buy these at a drugstore. Put the pad around the plantar wart so that it relieves pressure on the wart. You also can place pads or cushions in your shoes to make walking more comfortable. · Take an over-the-counter pain medicine, such as acetaminophen (Tylenol), ibuprofen (Advil, Motrin), or naproxen (Aleve). Read and follow all instructions on the label. · Do not take two or more pain medicines at the same time unless the doctor told you to. Many pain medicines have acetaminophen, which is Tylenol.  Too much acetaminophen (Tylenol) can be harmful. To avoid spreading warts  · Keep warts covered with a bandage or athletic tape. · Do not bite your nails or cuticles. This may spread warts from one finger to another. When should you call for help? Call your doctor now or seek immediate medical care if:    · You have signs of infection, such as:  ? Increased pain, swelling, warmth, or redness. ? Red streaks leading from a wart. ? Pus draining from a wart. ? A fever. Watch closely for changes in your health, and be sure to contact your doctor if:    · You do not get better as expected. Where can you learn more? Go to https://[employer].eTruck. org and sign in to your Supportie account. Enter I095 in the [employer] box to learn more about \"Warts: Care Instructions. \"     If you do not have an account, please click on the \"Sign Up Now\" link. Current as of: July 2, 2020               Content Version: 12.6  © 4276-9670 [employer]. Care instructions adapted under license by [facility] ([facility]). If you have questions about a medical condition or this instruction, always ask your healthcare professional. [place] [address] any warranty or liability for your use of this information. Patient Education        Neck Pain: Care Instructions  Your Care Instructions     You can have neck pain anywhere from the bottom of your head to the top of your shoulders. It can spread to the upper back or arms. Injuries, painting a ceiling, sleeping with your neck twisted, staying in one position for too long, and many other activities can cause neck pain. Most neck pain gets better with home care. Your doctor may recommend medicine to relieve pain or relax your muscles. He or she may suggest exercise and physical therapy to increase flexibility and relieve stress. You may need to wear a special (cervical) collar to support your neck for a day or two.   Follow-up care is a key part of your treatment and safety. Be sure to make and go to all appointments, and call your doctor if you are having problems. It's also a good idea to know your test results and keep a list of the medicines you take. How can you care for yourself at home? · Try using a heating pad on a low or medium setting for 15 to 20 minutes every 2 or 3 hours. Try a warm shower in place of one session with the heating pad. · You can also try an ice pack for 10 to 15 minutes every 2 to 3 hours. Put a thin cloth between the ice and your skin. · Take pain medicines exactly as directed. ? If the doctor gave you a prescription medicine for pain, take it as prescribed. ? If you are not taking a prescription pain medicine, ask your doctor if you can take an over-the-counter medicine. · If your doctor recommends a cervical collar, wear it exactly as directed. When should you call for help? Call your doctor now or seek immediate medical care if:    · You have new or worsening numbness in your arms, buttocks or legs.     · You have new or worsening weakness in your arms or legs. (This could make it hard to stand up.)     · You lose control of your bladder or bowels. Watch closely for changes in your health, and be sure to contact your doctor if:    · Your neck pain is getting worse.     · You are not getting better after 1 week.     · You do not get better as expected. Where can you learn more? Go to https://chpepierce.Minteos. org and sign in to your BetterYou account. Enter [id number] in the [facility] box to learn more about \"Neck Pain: Care Instructions. \"     If you do not have an account, please click on the \"Sign Up Now\" link. Current as of: March 2, 2020               Content Version: 12.6  © 4483-1175 [employer]. Care instructions adapted under license by [facility] ([facility]).  If you have questions about a medical condition or this instruction, always ask your healthcare professional. Corey Estes disclaims any warranty or liability for your use of this information.

## 2021-01-11 DIAGNOSIS — E10.9 TYPE 1 DIABETES MELLITUS WITHOUT COMPLICATION (HCC): ICD-10-CM

## 2021-01-11 NOTE — TELEPHONE ENCOUNTER
[facility] pharmacy called they need refill of novalog sent in.  [facility] Luige Aubrey [address] IN [employer] - BLUE [address] [place] [address], [address] [place] [address] Avenue  [phone] - F [phone]

## 2021-01-20 ENCOUNTER — TELEPHONE (OUTPATIENT)
Dept: ENDOCRINOLOGY | Age: 26
End: 2021-01-20

## 2021-01-20 DIAGNOSIS — E10.9 TYPE 1 DIABETES MELLITUS WITHOUT COMPLICATION (HCC): ICD-10-CM

## 2021-01-20 NOTE — TELEPHONE ENCOUNTER
Patient called and said her insurance is [employer] and needs a script sent for novolog not humalog and also needs a PA

## 2021-01-21 NOTE — TELEPHONE ENCOUNTER
Patient needs Rx sent to [facility] on [address]. Pharmacy told her that insurance prefers novolog this year, however it would still need a PA. Asim Mancini can you go ahead and start a PA?     Requested Prescriptions     Pending Prescriptions Disp Refills    HUMALOG 100 UNIT/ML injection vial 3 vial 2     Sig: Inject 100 Units into the skin daily VIA INSULIN PUMP    insulin aspart (NOVOLOG) 100 UNIT/ML injection vial 3 vial 2     Si units into skin daily via insulin pump       Last OV 20 Georgie Dotson)  Next OV 3/31/21 Kavin Granger)

## 2021-01-22 ENCOUNTER — TELEPHONE (OUTPATIENT)
Dept: ENDOCRINOLOGY | Age: 26
End: 2021-01-22

## 2021-01-22 NOTE — TELEPHONE ENCOUNTER
Per [facility] is requested for Humalog. Insurance will not cover  Humalog without PA. [facility] is scanned into media.     Please advise

## 2021-03-29 ENCOUNTER — TELEPHONE (OUTPATIENT)
Dept: ENDOCRINOLOGY | Age: 26
End: 2021-03-29

## 2021-03-30 DIAGNOSIS — E10.9 TYPE 1 DIABETES MELLITUS WITHOUT COMPLICATION (HCC): Primary | ICD-10-CM

## 2021-04-02 DIAGNOSIS — E10.9 TYPE 1 DIABETES MELLITUS WITHOUT COMPLICATION (HCC): ICD-10-CM

## 2021-04-02 LAB
A/G RATIO: 2.1 (ref 1.1–2.2)
ALBUMIN SERPL-MCNC: 4.8 G/DL (ref 3.4–5)
ALP BLD-CCNC: 70 U/L (ref 40–129)
ALT SERPL-CCNC: 10 U/L (ref 10–40)
ANION GAP SERPL CALCULATED.3IONS-SCNC: 11 MMOL/L (ref 3–16)
AST SERPL-CCNC: 14 U/L (ref 15–37)
BILIRUB SERPL-MCNC: <0.2 MG/DL (ref 0–1)
BUN BLDV-MCNC: 14 MG/DL (ref 7–20)
CALCIUM SERPL-MCNC: 9.5 MG/DL (ref 8.3–10.6)
CHLORIDE BLD-SCNC: 103 MMOL/L (ref 99–110)
CHOLESTEROL, TOTAL: 161 MG/DL (ref 0–199)
CO2: 27 MMOL/L (ref 21–32)
CREAT SERPL-MCNC: 0.7 MG/DL (ref 0.6–1.1)
CREATININE URINE: 106.6 MG/DL (ref 28–259)
GFR AFRICAN AMERICAN: >60
GFR NON-AFRICAN AMERICAN: >60
GLOBULIN: 2.3 G/DL
GLUCOSE BLD-MCNC: 136 MG/DL (ref 70–99)
HDLC SERPL-MCNC: 59 MG/DL (ref 40–60)
LDL CHOLESTEROL CALCULATED: 82 MG/DL
MICROALBUMIN UR-MCNC: <1.2 MG/DL
MICROALBUMIN/CREAT UR-RTO: NORMAL MG/G (ref 0–30)
POTASSIUM SERPL-SCNC: 4.5 MMOL/L (ref 3.5–5.1)
SODIUM BLD-SCNC: 141 MMOL/L (ref 136–145)
TOTAL PROTEIN: 7.1 G/DL (ref 6.4–8.2)
TRIGL SERPL-MCNC: 98 MG/DL (ref 0–150)
TSH REFLEX FT4: 1.61 UIU/ML (ref 0.27–4.2)
VLDLC SERPL CALC-MCNC: 20 MG/DL

## 2021-04-03 LAB
ESTIMATED AVERAGE GLUCOSE: 125.5 MG/DL
HBA1C MFR BLD: 6 %

## 2021-04-08 ENCOUNTER — VIRTUAL VISIT (OUTPATIENT)
Dept: ENDOCRINOLOGY | Age: 26
End: 2021-04-08
Payer: COMMERCIAL

## 2021-04-08 DIAGNOSIS — E10.9 TYPE 1 DIABETES MELLITUS WITHOUT COMPLICATION (HCC): Primary | ICD-10-CM

## 2021-04-08 DIAGNOSIS — E78.2 MIXED HYPERLIPIDEMIA: ICD-10-CM

## 2021-04-08 PROCEDURE — 99213 OFFICE O/P EST LOW 20 MIN: CPT | Performed by: NURSE PRACTITIONER

## 2021-04-08 NOTE — PROGRESS NOTES
Endocrinology  Ammon Dubin, [place], [address] [facility] [address]   [address] [facility], [address]  Phone [phone]  Fax [phone]    Amarilis Romo is a 31 y.o. female who is a new patient following up for management of Diabetes Mellitus Type 1. PCP Mimi Oquendo MD    Last A1C:   Lab Results   Component Value Date    LABA1C 6.0 04/02/2021    LABA1C 6.2 09/28/2020    LABA1C 6.5 05/28/2020     Last BP Readings:  BP Readings from Last 3 Encounters:   10/30/20 122/64   09/28/20 122/78   02/06/20 120/78     Last LDL:   Lab Results   Component Value Date    LDLCALC 82 04/02/2021     Aspirin Use: no    Tobacco/Alcohol History:  Social History     Tobacco Use   Smoking Status Never Smoker   Smokeless Tobacco Never Used      Social History     Substance and Sexual Activity   Alcohol Use Yes    Comment: Few drinks a few times per month     PMH includes  Past Medical History:   Diagnosis Date    DM type 2 (diabetes mellitus, type 2) ([facility] [address]      Family History   Problem Relation Age of Onset    Hypertension Maternal Grandmother     Diabetes Maternal Grandmother        Diabetes:   Diagnosed with Diabetes Mellitus type 1 in 09/17     She was experiencing headache, fatigue, vision changes. She checked her sugars using a friends glucose monitor  and her sugars were very high. Also had polydipsia and polyuria.      She went to ER. BS was 338. Beta hydroxybutyrate was high at 1.43      Microvascular complications:   · No known retinopathy (Last eye exam: 2017)  · No Nephropathy.   · No Peripheral neuropathy    Home regimen:    She is using Novolog via [employer] 630 G insulin pump since 07/18     Basal   0.00   0.750  3.00 A.M 0.800  7.00 AM   0.750  3.00 P.m 0.750     Carb ratio  0.00  12  17.00  10     Sensitivity  1:35     Target 120-140     Previous meds : janumet, Lantus,Metformin      Blood glucose trend  Checks 4 times a day     BS    Hypoglycemia at noon when more active.      Diet: Eats 3 Order faxed to central scheduling.      meals/day  Nutrition education: Yes  Exercise: twice a week.      No polyuria, polydipsia  No nausea, vomiting     No episodes of hypoglycemia.    No FH of DM.    Works at a [employer] institution. Hyperlipidemia: Current complaints include occasional myalgias but otherwise tolerates well. Lab Results   Component Value Date    CHOL 161 04/02/2021    CHOL 180 05/28/2020    CHOL 154 10/24/2018     Lab Results   Component Value Date    TRIG 98 04/02/2021    TRIG 71 05/28/2020    TRIG 70 10/24/2018     Lab Results   Component Value Date    HDL 59 04/02/2021    HDL 64 05/28/2020    HDL 40 10/24/2018     Lab Results   Component Value Date    LDLCALC 82 04/02/2021 [address] 102 05/28/2020 [address] 100 10/24/2018     No results found for: LDLDIRECT  No results found for: CHOLHDLRATIO    Vitamin D deficiency: Currently is on MV. Current complaints include fatigue on daily basis. Last vitamin Dlevel is:  No results found for: VD23T, VITD3, VD25, VITD25    Hypertension  Controlled , denies symptoms of dizziness, light headedness. Occasional dependent edema. Tries to follow a salt restricted diet. Lab Results   Component Value Date     04/02/2021    K 4.5 04/02/2021     04/02/2021    CO2 27 04/02/2021    BUN 14 04/02/2021    CREATININE 0.7 04/02/2021    GLUCOSE 136 (H) 04/02/2021    CALCIUM 9.5 04/02/2021    PROT 7.1 04/02/2021    LABALBU 4.8 04/02/2021    BILITOT <0.2 04/02/2021    ALKPHOS 70 04/02/2021    AST 14 (L) 04/02/2021    ALT 10 04/02/2021    LABGLOM >60 04/02/2021    GFRAA >60 04/02/2021    AGRATIO 2.1 04/02/2021    GLOB 2.3 04/02/2021     The ASCVD Risk score (Mckenna Sweet, et al., 2013) failed to calculate for the following reasons: The 2013 ASCVD risk score is only valid for ages 36 to 78      Review of Systems   Constitutional: Positive for fatigue. Negative for activity change, appetite change, diaphoresis, fever and unexpected weight change. HENT: Negative for dental problem.     Eyes: Negative for pain and visual disturbance. Respiratory: Negative for shortness of breath. Cardiovascular: Negative for chest pain, palpitations and leg swelling. Gastrointestinal: Negative for abdominal distention, constipation, diarrhea and nausea. Endocrine: Negative for cold intolerance, heat intolerance, polydipsia, polyphagia and polyuria. Genitourinary: Negative. Negative for frequency and urgency. Musculoskeletal: Negative for arthralgias, back pain, joint swelling and myalgias. Skin: Negative. Negative for color change and pallor. Allergic/Immunologic: Negative. Neurological: Negative for dizziness, weakness, numbness and headaches. Psychiatric/Behavioral: Negative for dysphoric mood and sleep disturbance. The patient is not nervous/anxious. There were no vitals filed for this visit. televisit    Physical Exam televisit    Skeletal foot exam: deferred. Assessment  Layne Mahan is a 31 y.o. female with uncontrolled Diabetes Mellitus type 1 and associated with HLD    Plan  Diabetes Mellitus Type 2  Lab Results   Component Value Date    LABA1C 6.0 04/02/2021     Goal A1c  < 6.5%  Avoid hypoglycemia  Reports no overnight hypoglycemia  Reports no post prandial lows  No change made to pump settings, A1c is at goal  Was not able to upload to care link    Diet :   30-40 grams per meal , 3 meals per day, avoid carbs in snack choices  Include moderate proteins and good fats   Ensure adequate hydration and  electrolyte replacement    Exercise :  Recommended exercise is 5-7 days a week for 30-60 mins at least, per day OR a total of 2.5 hours per week , which ever is more feasible.   Ambulate as possible     Diabetic Health Maintenance  Follow up with annual eye exams  Follow up with annual podiatry exams if needed  Reviewed sick day management of BG     Other areas of Diabetic Education reviewed:   Carbs: good carbs and bad carbs, importance of carb counting, incorporation of protein with each meal to reduce Glycemic index, importance of portions, Carb/insulin ratio   Fats: Good fats and bad fats, meal planning and supplements.  Discussed how food affects blood sugar readings.  Different diabetic medications   Managing high and low sugar readings   Rotation of sites for subcutaneous medication injection    Mixed Hyperlipidemia  Lab Results   Component Value Date    LDLCALC 82 04/02/2021    LDLCALC 102 05/28/2020    LDLCALC 100 10/24/2018     No results found for: LDLDIRECT  Lab Results   Component Value Date    TRIG 98 04/02/2021    TRIG 71 05/28/2020    TRIG 70 10/24/2018     LDL goal  < 100;   TG not elevated at 98  Continue current regimen  Managed by PCP    Essential Hypertension  Blood pressure usually well controlled. Continue current regimen    Vitamin D deficiency  No results found for: VITD25  Continue to supplement    Problem List Items Addressed This Visit     Type 1 diabetes mellitus without complication ([address] - Primary    Mixed hyperlipidemia          Greater than 30 minutes spent directly counseling patient about topics listed above (such as lifestyle modifications, preventative screenings and/or disease related processes. Reviewed and discussed recent labs  Return in about 6 months (around 10/8/2021).

## 2021-05-01 DIAGNOSIS — E10.9 TYPE 1 DIABETES MELLITUS WITHOUT COMPLICATION (HCC): ICD-10-CM

## 2021-05-12 ENCOUNTER — TELEPHONE (OUTPATIENT)
Dept: ENDOCRINOLOGY | Age: 26
End: 2021-05-12

## 2021-05-12 NOTE — TELEPHONE ENCOUNTER
Called patient to review her carelink pump upload  Discussed overnight hypoglycemia and pump suspension at night  She reports overall higher BG through the day, however recently completed her menstrual period which causes marked BG fluctuation   Discussed maintaining A1c between 6.5 -7%    Settings adjusted as below  Basal   0.00   0.725  3.00 A.M 0.775--> 0.725  7.00 AM   0.750  3.00 P.m 0.750     Carb ratio  0.00  12  17.00  10     Sensitivity  1:35     Target 120-140

## 2021-05-14 ENCOUNTER — TELEPHONE (OUTPATIENT)
Dept: ENDOCRINOLOGY | Age: 26
End: 2021-05-14

## 2021-05-17 NOTE — TELEPHONE ENCOUNTER
Signed paperwork. Noted that she is seeing Jayden Gutiérrez. Will address future requests with Jayden Gutiérrez.

## 2021-05-19 ENCOUNTER — PATIENT MESSAGE (OUTPATIENT)
Dept: ENDOCRINOLOGY | Age: 26
End: 2021-05-19

## 2021-05-19 DIAGNOSIS — E10.9 TYPE 1 DIABETES MELLITUS WITHOUT COMPLICATION (HCC): Primary | ICD-10-CM

## 2021-05-21 ENCOUNTER — TELEPHONE (OUTPATIENT)
Dept: ENDOCRINOLOGY | Age: 26
End: 2021-05-21

## 2021-05-21 RX ORDER — BLOOD-GLUCOSE TRANSMITTER
EACH MISCELLANEOUS
Qty: 1 EACH | Refills: 3 | Status: SHIPPED | OUTPATIENT
Start: 2021-05-21

## 2021-05-21 RX ORDER — BLOOD-GLUCOSE SENSOR
EACH MISCELLANEOUS
Qty: 1 EACH | Refills: 11 | Status: SHIPPED | OUTPATIENT
Start: 2021-05-21

## 2021-05-21 RX ORDER — INSULIN PUMP CONTROLLER
EACH MISCELLANEOUS
COMMUNITY
Start: 2021-05-18

## 2021-05-21 RX ORDER — BLOOD-GLUCOSE,RECEIVER,CONT
EACH MISCELLANEOUS
Qty: 1 DEVICE | Refills: 0 | Status: SHIPPED | OUTPATIENT
Start: 2021-05-21

## 2021-05-21 NOTE — TELEPHONE ENCOUNTER
[facility] CALLED BECAUSE ALL OF THIS PATIENT'S DEXCOM SUPPLIES ARE NOT COVERED BY THE INSURANCE. THEY NEED A NEW ORDER/CLARIFICATION TO GET APPROVED. PLEASE FOLLOW UP WITH THE PHARMACY.

## 2021-05-24 NOTE — TELEPHONE ENCOUNTER
Received fax from [facility] today and will fill it out and fax back for patients Dexcom G6 supplies. These need to go thru the DME not the pharmacy.

## 2021-06-23 PROBLEM — E10.9 DIABETES MELLITUS TYPE 1, CONTROLLED, WITHOUT COMPLICATIONS (HCC): Status: ACTIVE | Noted: 2021-06-23

## 2021-07-07 ENCOUNTER — VIRTUAL VISIT (OUTPATIENT)
Dept: ENDOCRINOLOGY | Age: 26
End: 2021-07-07
Payer: COMMERCIAL

## 2021-07-07 DIAGNOSIS — E10.9 DIABETES MELLITUS TYPE 1, CONTROLLED, WITHOUT COMPLICATIONS (HCC): Primary | ICD-10-CM

## 2021-07-07 PROCEDURE — 99213 OFFICE O/P EST LOW 20 MIN: CPT | Performed by: NURSE PRACTITIONER

## 2021-07-07 NOTE — PROGRESS NOTES
Endocrinology  Ce Reyes CNP, [address] [facility], [address]  Phone [phone]  Fax [phone]    Abbie Fish is  being evaluated by a Virtual Visit (video visit) encounter to address concerns as mentioned above. Due to this being a TeleHealth encounter (During - public health emergency), evaluation of the following organ systems was limited: Vitals/Constitutional/EENT/Resp/CV/GI//MS/Neuro/Skin/Heme-Lymph-Imm. Pursuant to the emergency declaration under the [address] [place] [address], [address] [place] [address] authority and the [employer] Act, this Virtual Visit was conducted with patient's (and/or legal guardian's) consent, to reduce the patient's risk of exposure to COVID-19 and provide necessary medical care. The patient (and/or legal guardian) has also been advised to contact this office for worsening conditions or problems, and seek emergency medical treatment and/or call 911 if deemed necessary. Services were provided through a video synchronous discussion virtually to substitute for in-person clinic visit. Patient and provider were located at their individual homes    Patient was identified via name,  on the video visit      Abbie Fish is a 31 y.o. female who is a new patient following up for management of Diabetes Mellitus Type 1.     PCP Rakesh Camacho MD    Last A1C:   Lab Results   Component Value Date    LABA1C 6.0 2021    LABA1C 6.2 2020    LABA1C 6.5 2020     Last BP Readings:  BP Readings from Last 3 Encounters:   10/30/20 122/64   20 122/78   20 120/78     Last LDL:   Lab Results   Component Value Date    LDLCALC 82 2021     Aspirin Use: no    Tobacco/Alcohol History:  Social History     Tobacco Use   Smoking Status Never Smoker   Smokeless Tobacco Never Used      Social History     Substance and Sexual Activity   Alcohol Use Yes    Comment: Few drinks a few times per month     PMH includes  Past Medical History:   Diagnosis Date    DM type 2 (diabetes mellitus, type 2) ([facility] [address]      Family History   Problem Relation Age of Onset    Hypertension Maternal Grandmother     Diabetes Maternal Grandmother        Diabetes:   Diagnosed with Diabetes Mellitus type 1 in 09/17     She was experiencing headache, fatigue, vision changes. She checked her sugars using a friends glucose monitor  and her sugars were very high. Also had polydipsia and polyuria.      She went to ER. BS was 338. Beta hydroxybutyrate was high at 1.43      Microvascular complications:   · No known retinopathy (Last eye exam: 2017)  · No Nephropathy. · No Peripheral neuropathy    Home regimen:    Omnipod settings  12 a to 3am: 0.7 u /hr  3 am to 7 am 0.7 u/hr  7 am to 3 pm 0.75 u/hr  3pm to 12 am 0.75 u/hr    AIT 3 hours  12 a to 5 p --> 12  5 p to 12 a--> 10    Sensitivity 1: 35    She  was using Novolog via [employer] 630 G insulin pump since 07/18     Basal   0.00   0.750  3.00 A.M 0.800  7.00 AM   0.750  3.00 P.m 0.750     Carb ratio  0.00  12  17.00  10     Sensitivity  1:35     Target 120-140     Previous meds : janumet, Lantus,Metformin      Blood glucose trend  Checks 4 times a day     BS    Hypoglycemia at noon when more active.      Diet: Eats 3 meals/day  Nutrition education: Yes  Exercise: twice a week.      No polyuria, polydipsia  No nausea, vomiting     No episodes of hypoglycemia.    No FH of DM.    Works at a [employer]. Hyperlipidemia: Current complaints include occasional myalgias but otherwise tolerates well.   Lab Results   Component Value Date    CHOL 161 04/02/2021    CHOL 180 05/28/2020    CHOL 154 10/24/2018     Lab Results   Component Value Date    TRIG 98 04/02/2021    TRIG 71 05/28/2020    TRIG 70 10/24/2018     Lab Results   Component Value Date    HDL 59 04/02/2021    HDL 64 05/28/2020    HDL 40 10/24/2018     Lab Results   Component Value Date    LDLCALC 82 04/02/2021 [address] 102 05/28/2020 [address] 100 10/24/2018     No results found for: LDLDIRECT  No results found for: CHOLHDLRATIO    Vitamin D deficiency: Currently is on MV. Current complaints include fatigue on daily basis. Last vitamin Dlevel is:  No results found for: VD23T, VITD3, VD25, VITD25    Hypertension  Controlled , denies symptoms of dizziness, light headedness. Occasional dependent edema. Tries to follow a salt restricted diet. Lab Results   Component Value Date     04/02/2021    K 4.5 04/02/2021     04/02/2021    CO2 27 04/02/2021    BUN 14 04/02/2021    CREATININE 0.7 04/02/2021    GLUCOSE 136 (H) 04/02/2021    CALCIUM 9.5 04/02/2021    PROT 7.1 04/02/2021    LABALBU 4.8 04/02/2021    BILITOT <0.2 04/02/2021    ALKPHOS 70 04/02/2021    AST 14 (L) 04/02/2021    ALT 10 04/02/2021    LABGLOM >60 04/02/2021    GFRAA >60 04/02/2021    AGRATIO 2.1 04/02/2021    GLOB 2.3 04/02/2021     The ASCVD Risk score (Geetha Garcia, et al., 2013) failed to calculate for the following reasons: The 2013 ASCVD risk score is only valid for ages 36 to 78      Review of Systems   Constitutional: Positive for fatigue. Negative for activity change, appetite change, diaphoresis, fever and unexpected weight change. HENT: Negative for dental problem. Eyes: Negative for pain and visual disturbance. Respiratory: Negative for shortness of breath. Cardiovascular: Negative for chest pain, palpitations and leg swelling. Gastrointestinal: Negative for abdominal distention, constipation, diarrhea and nausea. Endocrine: Negative for cold intolerance, heat intolerance, polydipsia, polyphagia and polyuria. Genitourinary: Negative. Negative for frequency and urgency. Musculoskeletal: Negative for arthralgias, back pain, joint swelling and myalgias. Skin: Negative. Negative for color change and pallor. Allergic/Immunologic: Negative.     Neurological: Negative for dizziness, weakness, numbness and headaches. Psychiatric/Behavioral: Negative for dysphoric mood and sleep disturbance. The patient is not nervous/anxious. There were no vitals filed for this visit. televisit    Physical Exam televisit    Skeletal foot exam: deferred. Assessment  Alycia Lora is a 31 y.o. female with uncontrolled Diabetes Mellitus type 1 and associated with HLD    Plan  Diabetes Mellitus Type 2  Lab Results   Component Value Date    LABA1C 6.0 04/02/2021     Goal A1c  < 6.5%  Avoid hypoglycemia  Reports no overnight hypoglycemia  Reports no post prandial lows  No change made to pump settings, A1c is at goal  Was not able to upload to care link prior to visit    Diet :   30-40 grams per meal , 3 meals per day, avoid carbs in snack choices  Include moderate proteins and good fats   Ensure adequate hydration and  electrolyte replacement    Exercise :  Recommended exercise is 5-7 days a week for 30-60 mins at least, per day OR a total of 2.5 hours per week , which ever is more feasible. Ambulate as possible     Diabetic Health Maintenance  Follow up with annual eye exams  Follow up with annual podiatry exams if needed  Reviewed sick day management of BG     Other areas of Diabetic Education reviewed:   Carbs: good carbs and bad carbs, importance of carb counting, incorporation of protein with each meal to reduce Glycemic index, importance of portions, Carb/insulin ratio   Fats: Good fats and bad fats, meal planning and supplements.  Discussed how food affects blood sugar readings.     Different diabetic medications   Managing high and low sugar readings   Rotation of sites for subcutaneous medication injection    Mixed Hyperlipidemia  Lab Results   Component Value Date    LDLCALC 82 04/02/2021    LDLCALC 102 05/28/2020    LDLCALC 100 10/24/2018     No results found for: LDLDIRECT  Lab Results   Component Value Date    TRIG 98 04/02/2021    TRIG 71 05/28/2020    TRIG 70 10/24/2018     LDL goal  < 100;   TG not elevated at 98  Continue current regimen  Managed by PCP    Essential Hypertension  Blood pressure usually well controlled.  Continue current regimen    Vitamin D deficiency  No results found for: VITD25  Continue to supplement    Problem List Items Addressed This Visit     Diabetes mellitus type 1, controlled, without complications ([facility] [address] - Primary        Patient will be following up with Dr Mirian Hernandez next month

## 2021-08-19 ENCOUNTER — OFFICE VISIT (OUTPATIENT)
Dept: ENDOCRINOLOGY | Age: 26
End: 2021-08-19
Payer: COMMERCIAL

## 2021-08-19 VITALS
HEIGHT: 66 IN | WEIGHT: 182 LBS | TEMPERATURE: 98 F | BODY MASS INDEX: 29.25 KG/M2 | OXYGEN SATURATION: 97 % | DIASTOLIC BLOOD PRESSURE: 80 MMHG | RESPIRATION RATE: 14 BRPM | SYSTOLIC BLOOD PRESSURE: 120 MMHG

## 2021-08-19 DIAGNOSIS — E10.9 DIABETES MELLITUS TYPE 1, CONTROLLED, WITHOUT COMPLICATIONS (HCC): Primary | ICD-10-CM

## 2021-08-19 LAB — HBA1C MFR BLD: 6 %

## 2021-08-19 PROCEDURE — 95251 CONT GLUC MNTR ANALYSIS I&R: CPT | Performed by: INTERNAL MEDICINE

## 2021-08-19 PROCEDURE — 99214 OFFICE O/P EST MOD 30 MIN: CPT | Performed by: INTERNAL MEDICINE

## 2021-08-19 PROCEDURE — 83036 HEMOGLOBIN GLYCOSYLATED A1C: CPT | Performed by: INTERNAL MEDICINE

## 2021-08-19 NOTE — PROGRESS NOTES
Brenda Cano is a 31 y.o. female who is being evaluated for Type 1 diabetes mellitus. H.    Current symptoms/problems include none and show no change. Diabetes mellitus type 1, controlled, without complications ([facility] [Y15.2]    Diagnosed with Type 1 diabetes mellitus in 9/2017. Comorbid conditions: hyperlipidemia    Current diabetic medications include: Novolog    Intolerance to diabetes medications: No     Weight trend: stable  Prior visit with dietician: yes  Current diet: on average, 3 meals per day  Current exercise: frequent     Current monitoring regimen: home blood tests - 4 times daily  Has brought blood glucose log/meter:  Yes  Home blood sugar records: fasting range:  and postprandial range:    Any episodes of hypoglycemia? Yes  Hypoglycemia frequency and time(s):  once a week  Does patient have Glucagon emergency kit? Yes  Does patient have rapid acting carbohydrate? Yes  Does patient wear a medic alert bracelet or necklace?   Yes         Past Medical History:   Diagnosis Date    DM type 2 (diabetes mellitus, type 2) ([facility]       Patient Active Problem List   Diagnosis    [facility]    Diabetes mellitus type 1, controlled, without complications ([facility]     Past Surgical History:   Procedure Laterality Date    TONSILLECTOMY AND ADENOIDECTOMY      TYMPANOSTOMY TUBE PLACEMENT      multiple times (3)    WISDOM TOOTH EXTRACTION       Social History     Socioeconomic History    Marital status:      Spouse name: Not on file    Number of children: Not on file    Years of education: Not on file    Highest education level: Not on file   Occupational History    Not on file   Tobacco Use    Smoking status: Never Smoker    Smokeless tobacco: Never Used   Vaping Use    Vaping Use: Never assessed   Substance and Sexual Activity    Alcohol use: Yes     Comment: Few drinks a few times per month    Drug use: No    Sexual activity: Yes Partners: Male     Birth control/protection: Pill, Condom     Comment: with boyfriend   Other Topics Concern    Not on file   Social History Narrative    Not on file     Social Determinants of Health     Financial Resource Strain:     Difficulty of Paying Living Expenses:    Food Insecurity:     Worried About Running Out of Food in the Last Year:     [address] in the Last Year:    Transportation Needs:     Lack of Transportation (Medical):  Lack of Transportation (Non-Medical):    Physical Activity:     Days of Exercise per Week:     Minutes of Exercise per Session:    Stress:     Feeling of Stress :    Social Connections:     Frequency of Communication with Friends and Family:     Frequency of Social Gatherings with Friends and Family:     Attends Hindu Services:     Active Member of Clubs or Organizations:     Attends Club or Organization Meetings:     Marital Status:    Intimate Partner Violence:     Fear of Current or Ex-Partner:     Emotionally Abused:     Physically Abused:     Sexually Abused:      Family History   Problem Relation Age of Onset    Hypertension Maternal Grandmother     Diabetes Maternal Grandmother      Current Outpatient Medications   Medication Sig Dispense Refill    Insulin Disposable Pump (OMNIPOD DASH 5 PACK PODS) MISC CHANGE PODS EVERY 72 HOURS AS DIRECTED      Continuous Blood Gluc Transmit (DEXCOM G6 TRANSMITTER) MISC USE AS DIRECTED FOR CONTINUOUS GLUCOSE MONITORING. CHANGE EVERY 3 MONTHS 1 each 3    Continuous Blood Gluc Sensor (DEXCOM G6 SENSOR) MISC USE AS DIRECTED FOR CONTINUOUS GLUCOSE MONITORING.  CHANGE EVERY 10 DAYS 1 each 11    Continuous Blood Gluc  (DEXCOM G6 ) ARNALDO USE AS DIRECTED FOR CONTINUOUS GLUCOSE MONITORING 1 Device 0    NOVOLOG 100 UNIT/ML injection vial INJECT 100 UNITS INTO SKIN DAILY VIA INSULIN PUMP 30 mL 2    Blood Glucose Monitoring Suppl (CONTOUR NEXT LINK) w/Device KIT by Does not apply route      Multiple Vitamins-Minerals (THERAPEUTIC MULTIVITAMIN-MINERALS) tablet Take 1 tablet by mouth daily      medroxyPROGESTERone (PROVERA) 10 MG tablet  (Patient not taking: Reported on 8/19/2021)       No current facility-administered medications for this visit.      No Known Allergies  Family Status   Relation Name Status    Mother  Alive    Father  Alive    Sister  Alive    Brother  Alive    MGM  Alive    Brother  Alive       Lab Review:    No results found for: WBC, HGB, HCT, MCV, PLT  Lab Results   Component Value Date     04/02/2021    K 4.5 04/02/2021     04/02/2021    CO2 27 04/02/2021    BUN 14 04/02/2021    CREATININE 0.7 04/02/2021    GLUCOSE 136 04/02/2021    CALCIUM 9.5 04/02/2021    PROT 7.1 04/02/2021    LABALBU 4.8 04/02/2021    BILITOT <0.2 04/02/2021    ALKPHOS 70 04/02/2021    AST 14 04/02/2021    ALT 10 04/02/2021    LABGLOM >60 04/02/2021    GFRAA >60 04/02/2021    AGRATIO 2.1 04/02/2021    GLOB 2.3 04/02/2021     Lab Results   Component Value Date    TSHFT4 1.61 04/02/2021    TSH 1.82 02/03/2018     Lab Results   Component Value Date    LABA1C 6.0 04/02/2021     Lab Results   Component Value Date    .5 04/02/2021     Lab Results   Component Value Date    CHOL 161 04/02/2021     Lab Results   Component Value Date    TRIG 98 04/02/2021     Lab Results   Component Value Date    HDL 59 04/02/2021     Lab Results   Component Value Date    LDLCALC 82 04/02/2021     Lab Results   Component Value Date    LABVLDL 20 04/02/2021     No results found for: [facility]  Lab Results   Component Value Date    LABMICR <1.20 04/02/2021     No results found for: VITD25     Review of Systems:  Constitutional: no fatigue, no fever, no recent weight gain, no recent weight loss, no changes in appetite  Eyes: no eye pain, no change in vision, no eye redness, no eye irritation, no double vision  Ears, nose, throat: no nasal congestion, no sore throat, no earache, no decrease in hearing, no hoarseness, no dry mouth, no sinus problems, no difficulty swallowing, no neck lumps, no dental problems, no mouth sores, no ringing in ears  Pulmonary: no shortness of breath, no wheezing, no dyspnea on exertion, no cough  Cardiovascular: no chest pain, no lower extremity edema, no orthopnea, no intermittent leg claudication, no palpitations  Gastrointestinal: no abdominal pain, no nausea, no vomiting, no diarrhea, no constipation, no dysphagia, no heartburn, no bloating  Genitourinary: no dysuria, no urinary incontinence, no urinary hesitancy, no urinary frequency, no feelings of urinary urgency, no nocturia  Musculoskeletal: no joint swelling, no joint stiffness, no joint pain, no muscle cramps, no muscle pain, no bone pain  Integument/Breast: no hair loss, no skin rashes, no skin lesions, no itching, no dry skin  Neurological: no numbness, no tingling, no weakness, no confusion, no headaches, no dizziness, no fainting, no tremors, no decrease in memory, no balance problems  Psychiatric: no anxiety, no depression, no insomnia  Hematologic/Lymphatic: no tendency for easy bleeding, no swollen lymph nodes, no tendency for easy bruising  Immunology: no seasonal allergies, no frequent infections, no frequent illnesses  Endocrine: no temperature intolerance    /80   Temp 98 °F (36.7 °C)   Resp 14   Ht 5' 6\" (1.676 m)   Wt 182 lb (82.6 kg)   SpO2 97%   BMI 29.38 kg/m²    Wt Readings from Last 3 Encounters:   08/19/21 182 lb (82.6 kg)   10/30/20 184 lb (83.5 kg)   09/28/20 181 lb 9.6 oz (82.4 kg)     Body mass index is 29.38 kg/m².       OBJECTIVE:  Constitutional: no acute distress, well appearing and well nourished  Psychiatric: oriented to person, place and time, judgement and insight and normal, recent and remote memory and intact and mood and affect are normal  Skin: skin and subcutaneous tissue is normal without mass, normal turgor  Head and Face: examination of head and face revealed no abnormalities  Eyes: no lid or conjunctival swelling, erythema or discharge, pupils are normal, equal, round, reactive to light  Ears/Nose: external inspection of ears and nose revealed no abnormalities, hearing is grossly normal  Oropharynx/Mouth/Face: lips, tongue and gums are normal with no lesions, the voice quality was normal  Neck: neck is supple and symmetric, with midline trachea and no masses, thyroid is normal  Lymphatics: normal cervical lymph nodes, normal supraclavicular nodes  Pulmonary: no increased work of breathing or signs of respiratory distress, lungs are clear to auscultation  Cardiovascular: normal heart rate and rhythm, normal S1 and S2, no murmurs and pedal pulses and 2+ bilaterally, No edema  Abdomen: abdomen is soft, non-tender with no masses  Musculoskeletal: normal gait and station and exam of the digits and nails are normal  Neurological: normal coordination and normal general cortical function    Visual inspection:  Deformity/amputation: absent  Skin lesions/pre-ulcerative calluses: absent  Edema: right- negative, left- negative    Sensory exam:  Monofilament sensation: normal  (minimum of 5 random plantar locations tested, avoiding callused areas - > 1 area with absence of sensation is + for neuropathy)    Plus at least one of the following:  Pulses: normal  Proprioception: Intact  Vibration (128 Hz): Intact    ASSESSMENT/PLAN:  1.  Diabetes mellitus type 1, controlled, without complications (HCC)  Hemoglobin A1c 6.0  - HM DIABETES FOOT EXAM  HbA1C in office  Lipid panel  CMP  Microalbumin creatinine ratio  Continue NovoLog and insulin pump    Reviewed and/or ordered clinical lab results Yes  Reviewed and/or ordered radiology tests Yes  Reviewed and/or ordered other diagnostic tests No  Discussed test results with performing physician No  Independently reviewed image, tracing, or specimen No  Made a decision to obtain old records No  Reviewed and summarized old records Yes   TSH 1.61  LDL 82  Hemoglobin A1c 6.0  Obtained history from other than patient No    HCETOR Cano was counseled regarding symptoms of diabetes diagnosis, course and complications of disease if inadequately treated, side effects of medications, diagnosis, treatment options, and prognosis, risks, benefits, complications, and alternatives of treatment, labs, imaging and other studies and treatment targets and goals, diabetes complication prevention, importance of diabetes control. She understands instructions and counseling. These diagnosis were discussed and reviewed with the patient including the advantages of drug therapy. She was counseled at this visit on the following: diabetes complication prevention and foot care. CGMS Download Review and Recommendations  See scanned document for CGM tracing documentation  [employer]/Guardian personal CGMS data downloaded and reviewed. This was separate service providedCGM interpretation    Average glucose 130± 39 SD  Time in range: 86%  Time above 180: 12%  Time under 70: 3%     Basal pattern review: Overall appropriate basal patterns  Postprandial pattern review: Postprandial hyperglycemia mostly after lunch and dinner  Hypoglycemia review: Early morning and afternoon hypoglycemia  Activity related review: Not recorded      Based on the data, I recommend:    1. Continue same rates and ratios    2. Do not underestimate carbs    3. Hypoglycemia management    4. Do timely bolus injections before meals    5. Continue close monitoring    Time I spent for this encounter 30 minutes, excluding CGM interpretation time  This was new patient for me. Previously Denzel Aguirre NP and Dr. Ana Laura Baptiste patient. I reviewed all notes, laboratory data, blood glucose records and imaging reports in details. Return in about 3 months (around 11/19/2021) for diabetes.     Electronically signed by Carlyon Denver, MD on 8/19/2021 at 12:26 PM

## 2021-10-14 DIAGNOSIS — E10.9 TYPE 1 DIABETES MELLITUS WITHOUT COMPLICATION (HCC): ICD-10-CM

## 2021-10-14 NOTE — TELEPHONE ENCOUNTER
Requested Prescriptions     Pending Prescriptions Disp Refills    NOVOLOG 100 UNIT/ML injection vial [Pharmacy Med Name: NOVOLOG 100 UNIT/ML VIAL] 30 mL 2     Sig: INJECT 100 UNITS INTO SKIN DAILY VIA INSULIN PUMP       Last refilled:5/4/2021  Last seen: 8/19/2021  Follow up: 11/30/2021

## 2021-11-30 ENCOUNTER — OFFICE VISIT (OUTPATIENT)
Dept: ENDOCRINOLOGY | Age: 26
End: 2021-11-30
Payer: COMMERCIAL

## 2021-11-30 VITALS
RESPIRATION RATE: 14 BRPM | OXYGEN SATURATION: 98 % | WEIGHT: 184 LBS | HEART RATE: 76 BPM | BODY MASS INDEX: 29.57 KG/M2 | TEMPERATURE: 98 F | DIASTOLIC BLOOD PRESSURE: 64 MMHG | SYSTOLIC BLOOD PRESSURE: 108 MMHG | HEIGHT: 66 IN

## 2021-11-30 DIAGNOSIS — E10.9 TYPE 1 DIABETES MELLITUS WITHOUT COMPLICATION (HCC): Primary | ICD-10-CM

## 2021-11-30 PROCEDURE — 99214 OFFICE O/P EST MOD 30 MIN: CPT | Performed by: INTERNAL MEDICINE

## 2021-11-30 PROCEDURE — 95251 CONT GLUC MNTR ANALYSIS I&R: CPT | Performed by: INTERNAL MEDICINE

## 2021-11-30 NOTE — PROGRESS NOTES
Liang Cano is a 31 y.o. female who is being evaluated for Type 1 diabetes mellitus. H.    Current symptoms/problems include none and show no change. Type 1 diabetes mellitus without complication ([facility]) [R67.6]    Diagnosed with Type 1 diabetes mellitus in 9/2017. Comorbid conditions: hyperlipidemia    Current diabetic medications include: Novolog    Intolerance to diabetes medications: No     Weight trend: stable  Prior visit with dietician: yes  Current diet: on average, 3 meals per day  Current exercise: frequent     Current monitoring regimen: home blood tests - 4 times daily  Has brought blood glucose log/meter:  Yes  Home blood sugar records: fasting range:  and postprandial range:    Any episodes of hypoglycemia? Yes  Hypoglycemia frequency and time(s):  once a week  Does patient have Glucagon emergency kit? Yes  Does patient have rapid acting carbohydrate? Yes  Does patient wear a medic alert bracelet or necklace?   Yes         Past Medical History:   Diagnosis Date    DM type 2 (diabetes mellitus, type 2) ([facility]       Patient Active Problem List   Diagnosis    [facility]    Diabetes mellitus type 1, controlled, without complications ([facility]     Past Surgical History:   Procedure Laterality Date    TONSILLECTOMY AND ADENOIDECTOMY      TYMPANOSTOMY TUBE PLACEMENT      multiple times (3)    WISDOM TOOTH EXTRACTION       Social History     Socioeconomic History    Marital status:      Spouse name: Not on file    Number of children: Not on file    Years of education: Not on file    Highest education level: Not on file   Occupational History    Not on file   Tobacco Use    Smoking status: Never Smoker    Smokeless tobacco: Never Used   Vaping Use    Vaping Use: Not on file   Substance and Sexual Activity    Alcohol use: Yes     Comment: Few drinks a few times per month    Drug use: No    Sexual activity: Yes     Partners: Male Birth control/protection: Pill, Condom     Comment: with boyfriend   Other Topics Concern    Not on file   Social History Narrative    Not on file     Social Determinants of Health     Financial Resource Strain:     Difficulty of Paying Living Expenses: Not on file   Food Insecurity:     Worried About Running Out of Food in the Last Year: Not on file    Milad of Food in the Last Year: Not on file   Transportation Needs:     Lack of Transportation (Medical): Not on file    Lack of Transportation (Non-Medical): Not on file   Physical Activity:     Days of Exercise per Week: Not on file    Minutes of Exercise per Session: Not on file   Stress:     Feeling of Stress : Not on file   Social Connections:     Frequency of Communication with Friends and Family: Not on file    Frequency of Social Gatherings with Friends and Family: Not on file    Attends Mu-ism Services: Not on file    Active Member of [address] [place] [address] [employer] or Organizations: Not on file    Attends Club or Organization Meetings: Not on file    Marital Status: Not on file   Intimate Partner Violence:     Fear of Current or Ex-Partner: Not on file    Emotionally Abused: Not on file    Physically Abused: Not on file    Sexually Abused: Not on file   Housing Stability:     Unable to Pay for Housing in the Last Year: Not on file    Number of Places Lived in the Last Year: Not on file    Unstable Housing in the Last Year: Not on file     Family History   Problem Relation Age of Onset    Hypertension Maternal Grandmother     Diabetes Maternal Grandmother      Current Outpatient Medications   Medication Sig Dispense Refill    NOVOLOG 100 UNIT/ML injection vial INJECT UP  UNITS INTO SKIN DAILY VIA INSULIN PUMP 40 mL 5    Insulin Disposable Pump (OMNIPOD DASH 5 PACK PODS) MISC CHANGE PODS EVERY 72 HOURS AS DIRECTED      Continuous Blood Gluc Transmit (DEXCOM G6 TRANSMITTER) MISC USE AS DIRECTED FOR CONTINUOUS GLUCOSE MONITORING.  CHANGE EVERY 3 MONTHS 1 each 3    Continuous Blood Gluc Sensor (DEXCOM G6 SENSOR) MISC USE AS DIRECTED FOR CONTINUOUS GLUCOSE MONITORING. CHANGE EVERY 10 DAYS 1 each 11    Continuous Blood Gluc  (DEXCOM G6 ) ARNALDO USE AS DIRECTED FOR CONTINUOUS GLUCOSE MONITORING 1 Device 0    Blood Glucose Monitoring Suppl (CONTOUR NEXT LINK) w/Device KIT by Does not apply route      Multiple Vitamins-Minerals (THERAPEUTIC MULTIVITAMIN-MINERALS) tablet Take 1 tablet by mouth daily      medroxyPROGESTERone (PROVERA) 10 MG tablet  (Patient not taking: Reported on 8/19/2021)       No current facility-administered medications for this visit.      No Known Allergies  Family Status   Relation Name Status    Mother  Alive    Father  Alive    Sister  Alive    Brother  Alive    MGM  Alive    Brother  Alive       Lab Review:    No results found for: WBC, HGB, HCT, MCV, PLT  Lab Results   Component Value Date     04/02/2021    K 4.5 04/02/2021     04/02/2021    CO2 27 04/02/2021    BUN 14 04/02/2021    CREATININE 0.7 04/02/2021    GLUCOSE 136 04/02/2021    CALCIUM 9.5 04/02/2021    PROT 7.1 04/02/2021    LABALBU 4.8 04/02/2021    BILITOT <0.2 04/02/2021    ALKPHOS 70 04/02/2021    AST 14 04/02/2021    ALT 10 04/02/2021    LABGLOM >60 04/02/2021    GFRAA >60 04/02/2021    AGRATIO 2.1 04/02/2021    GLOB 2.3 04/02/2021     Lab Results   Component Value Date    TSHFT4 1.61 04/02/2021    TSH 1.82 02/03/2018     Lab Results   Component Value Date    LABA1C 6.0 08/19/2021     Lab Results   Component Value Date    .5 04/02/2021     Lab Results   Component Value Date    CHOL 161 04/02/2021     Lab Results   Component Value Date    TRIG 98 04/02/2021     Lab Results   Component Value Date    HDL 59 04/02/2021     Lab Results   Component Value Date    LDLCALC 82 04/02/2021     Lab Results   Component Value Date    LABVLDL 20 04/02/2021     No results found for: [facility]  Lab Results   Component Value Date    LABMICR <1.20 04/02/2021     No results found for: VITD25     Review of Systems:  Constitutional: no fatigue, no fever, no recent weight gain, no recent weight loss, no changes in appetite  Eyes: no eye pain, no change in vision, no eye redness, no eye irritation, no double vision  Ears, nose, throat: no nasal congestion, no sore throat, no earache, no decrease in hearing, no hoarseness, no dry mouth, no sinus problems, no difficulty swallowing, no neck lumps, no dental problems, no mouth sores, no ringing in ears  Pulmonary: no shortness of breath, no wheezing, no dyspnea on exertion, no cough  Cardiovascular: no chest pain, no lower extremity edema, no orthopnea, no intermittent leg claudication, no palpitations  Gastrointestinal: no abdominal pain, no nausea, no vomiting, no diarrhea, no constipation, no dysphagia, no heartburn, no bloating  Genitourinary: no dysuria, no urinary incontinence, no urinary hesitancy, no urinary frequency, no feelings of urinary urgency, no nocturia  Musculoskeletal: no joint swelling, no joint stiffness, no joint pain, no muscle cramps, no muscle pain, no bone pain  Integument/Breast: no hair loss, no skin rashes, no skin lesions, no itching, no dry skin  Neurological: no numbness, no tingling, no weakness, no confusion, no headaches, no dizziness, no fainting, no tremors, no decrease in memory, no balance problems  Psychiatric: no anxiety, no depression, no insomnia  Hematologic/Lymphatic: no tendency for easy bleeding, no swollen lymph nodes, no tendency for easy bruising  Immunology: no seasonal allergies, no frequent infections, no frequent illnesses  Endocrine: no temperature intolerance    /64   Pulse 76   Temp 98 °F (36.7 °C)   Resp 14   Ht 5' 6\" (1.676 m)   Wt 184 lb (83.5 kg)   SpO2 98%   BMI 29.70 kg/m²    Wt Readings from Last 3 Encounters:   11/30/21 184 lb (83.5 kg)   08/19/21 182 lb (82.6 kg)   10/30/20 184 lb (83.5 kg)     Body mass index is 29.7 kg/m². OBJECTIVE:  Constitutional: no acute distress, well appearing and well nourished  Psychiatric: oriented to person, place and time, judgement and insight and normal, recent and remote memory and intact and mood and affect are normal  Skin: skin and subcutaneous tissue is normal without mass, normal turgor  Head and Face: examination of head and face revealed no abnormalities  Eyes: no lid or conjunctival swelling, erythema or discharge, pupils are normal, equal, round, reactive to light  Ears/Nose: external inspection of ears and nose revealed no abnormalities, hearing is grossly normal  Oropharynx/Mouth/Face: lips, tongue and gums are normal with no lesions, the voice quality was normal  Neck: neck is supple and symmetric, with midline trachea and no masses, thyroid is normal  Lymphatics: normal cervical lymph nodes, normal supraclavicular nodes  Pulmonary: no increased work of breathing or signs of respiratory distress, lungs are clear to auscultation  Cardiovascular: normal heart rate and rhythm, normal S1 and S2, no murmurs and pedal pulses and 2+ bilaterally, No edema  Abdomen: abdomen is soft, non-tender with no masses  Musculoskeletal: normal gait and station and exam of the digits and nails are normal  Neurological: normal coordination and normal general cortical function    ASSESSMENT/PLAN:  1.  Diabetes mellitus type 1, controlled, without complications (HCC)  Hemoglobin A1c 6.05.5  HbA1C in office  Lipid panel  CMP  Microalbumin creatinine ratio  Continue NovoLog and insulin pump    Reviewed and/or ordered clinical lab results Yes  Reviewed and/or ordered radiology tests Yes  Reviewed and/or ordered other diagnostic tests No  Discussed test results with performing physician No  Independently reviewed image, tracing, or specimen No  Made a decision to obtain old records No  Reviewed and summarized old records Yes   TSH 1.61  LDL 82  Hemoglobin A1c 6.0  Obtained history from other than patient

## 2022-05-23 RX ORDER — INSULIN PUMP CONTROLLER
EACH MISCELLANEOUS
Qty: 30 EACH | OUTPATIENT
Start: 2022-05-23